# Patient Record
Sex: MALE | Race: WHITE | NOT HISPANIC OR LATINO | Employment: OTHER | URBAN - METROPOLITAN AREA
[De-identification: names, ages, dates, MRNs, and addresses within clinical notes are randomized per-mention and may not be internally consistent; named-entity substitution may affect disease eponyms.]

---

## 2018-02-27 ENCOUNTER — HOSPITAL ENCOUNTER (OUTPATIENT)
Dept: GENERAL RADIOLOGY | Facility: HOSPITAL | Age: 78
Discharge: HOME | End: 2018-02-27

## 2018-02-27 LAB
ALBUMIN SERPL-MCNC: 3.9 G/DL (ref 3.5–4.8)
ALBUMIN/GLOB SERPL: 1.3 {RATIO} (ref 1–1.7)
ALP SERPL-CCNC: 42 IU/L (ref 32–91)
ALT SERPL-CCNC: 14 IU/L (ref 17–63)
ANION GAP SERPL CALC-SCNC: 13.6 MMOL/L (ref 10–20)
AST SERPL-CCNC: 20 IU/L (ref 15–41)
BASOPHILS # BLD AUTO: 0 10*3/UL (ref 0–0.2)
BASOPHILS NFR BLD AUTO: 0 % (ref 0–2)
BILIRUB SERPL-MCNC: 0.2 MG/DL (ref 0.3–1.2)
BUN SERPL-MCNC: 26 MG/DL (ref 8–20)
BUN/CREAT SERPL: 18.6 (ref 6.2–20.3)
CALCIUM SERPL-MCNC: 9.3 MG/DL (ref 8.9–10.3)
CHLORIDE SERPL-SCNC: 99 MMOL/L (ref 101–111)
CONV CO2: 23 MMOL/L (ref 22–32)
CONV TOTAL PROTEIN: 7 G/DL (ref 6.1–7.9)
CREAT UR-MCNC: 1.4 MG/DL (ref 0.7–1.2)
DIFFERENTIAL METHOD BLD: (no result)
EOSINOPHIL # BLD AUTO: 0 10*3/UL (ref 0–0.3)
EOSINOPHIL # BLD AUTO: 1 % (ref 0–3)
ERYTHROCYTE [DISTWIDTH] IN BLOOD BY AUTOMATED COUNT: 14.6 % (ref 11.5–14.5)
GLOBULIN UR ELPH-MCNC: 3.1 G/DL (ref 2.5–3.8)
GLUCOSE SERPL-MCNC: 120 MG/DL (ref 65–99)
HCT VFR BLD AUTO: 36.2 % (ref 40–54)
HGB BLD-MCNC: 12.2 G/DL (ref 14–18)
LYMPHOCYTES # BLD AUTO: 1.3 10*3/UL (ref 0.8–4.8)
LYMPHOCYTES NFR BLD AUTO: 15 % (ref 18–42)
MCH RBC QN AUTO: 29 PG (ref 26–32)
MCHC RBC AUTO-ENTMCNC: 33.7 G/DL (ref 32–36)
MCV RBC AUTO: 86.2 FL (ref 80–94)
MONOCYTES # BLD AUTO: 0.6 10*3/UL (ref 0.1–1.3)
MONOCYTES NFR BLD AUTO: 8 % (ref 2–11)
NEUTROPHILS # BLD AUTO: 6.5 10*3/UL (ref 2.3–8.6)
NEUTROPHILS NFR BLD AUTO: 76 % (ref 50–75)
NRBC BLD AUTO-RTO: 0 /100{WBCS}
NRBC/RBC NFR BLD MANUAL: 0 10*3/UL
PLATELET # BLD AUTO: 236 10*3/UL (ref 150–450)
PMV BLD AUTO: 8.6 FL (ref 7.4–10.4)
POTASSIUM SERPL-SCNC: 3.6 MMOL/L (ref 3.6–5.1)
RBC # BLD AUTO: 4.2 10*6/UL (ref 4.6–6)
SODIUM SERPL-SCNC: 132 MMOL/L (ref 136–144)
WBC # BLD AUTO: 8.5 10*3/UL (ref 4.5–11.5)

## 2019-07-09 ENCOUNTER — ANTICOAGULATION VISIT (OUTPATIENT)
Dept: CARDIOLOGY | Facility: CLINIC | Age: 79
End: 2019-07-09

## 2019-07-09 DIAGNOSIS — I48.20 CHRONIC ATRIAL FIBRILLATION (HCC): ICD-10-CM

## 2019-07-09 DIAGNOSIS — Z79.01 LONG TERM (CURRENT) USE OF ANTICOAGULANTS: ICD-10-CM

## 2019-07-09 PROBLEM — I48.91 ATRIAL FIBRILLATION: Status: ACTIVE | Noted: 2019-07-09

## 2019-07-09 LAB — INR PPP: 2.5 (ref 2–3)

## 2019-07-15 RX ORDER — WARFARIN SODIUM 4 MG/1
TABLET ORAL
Qty: 90 TABLET | Refills: 0 | Status: SHIPPED | OUTPATIENT
Start: 2019-07-15 | End: 2019-11-20 | Stop reason: SDUPTHER

## 2019-08-13 ENCOUNTER — OUTSIDE FACILITY SERVICE (OUTPATIENT)
Dept: CARDIOLOGY | Facility: CLINIC | Age: 79
End: 2019-08-13

## 2019-08-13 PROCEDURE — 99213 OFFICE O/P EST LOW 20 MIN: CPT | Performed by: INTERNAL MEDICINE

## 2019-09-12 ENCOUNTER — ANTICOAGULATION VISIT (OUTPATIENT)
Dept: CARDIOLOGY | Facility: CLINIC | Age: 79
End: 2019-09-12

## 2019-09-12 DIAGNOSIS — I48.20 CHRONIC ATRIAL FIBRILLATION (HCC): ICD-10-CM

## 2019-09-12 DIAGNOSIS — Z79.01 LONG TERM (CURRENT) USE OF ANTICOAGULANTS: ICD-10-CM

## 2019-09-12 LAB — INR PPP: 2.7

## 2019-10-17 ENCOUNTER — ANTICOAGULATION VISIT (OUTPATIENT)
Dept: CARDIOLOGY | Facility: CLINIC | Age: 79
End: 2019-10-17

## 2019-10-17 DIAGNOSIS — Z79.01 LONG TERM (CURRENT) USE OF ANTICOAGULANTS: ICD-10-CM

## 2019-10-17 DIAGNOSIS — I48.91 ATRIAL FIBRILLATION, UNSPECIFIED TYPE (HCC): ICD-10-CM

## 2019-10-17 LAB — INR PPP: 3

## 2019-11-18 ENCOUNTER — ANTICOAGULATION VISIT (OUTPATIENT)
Dept: CARDIOLOGY | Facility: CLINIC | Age: 79
End: 2019-11-18

## 2019-11-18 DIAGNOSIS — Z79.01 LONG TERM (CURRENT) USE OF ANTICOAGULANTS: ICD-10-CM

## 2019-11-18 DIAGNOSIS — I48.91 ATRIAL FIBRILLATION, UNSPECIFIED TYPE (HCC): ICD-10-CM

## 2019-11-18 LAB — INR PPP: 2.8

## 2019-11-20 RX ORDER — WARFARIN SODIUM 4 MG/1
TABLET ORAL
Qty: 90 TABLET | Refills: 0 | Status: SHIPPED | OUTPATIENT
Start: 2019-11-20 | End: 2020-02-26 | Stop reason: SDUPTHER

## 2019-12-23 ENCOUNTER — ANTICOAGULATION VISIT (OUTPATIENT)
Dept: CARDIOLOGY | Facility: CLINIC | Age: 79
End: 2019-12-23

## 2019-12-23 DIAGNOSIS — Z79.01 LONG TERM (CURRENT) USE OF ANTICOAGULANTS: ICD-10-CM

## 2019-12-23 DIAGNOSIS — I48.91 ATRIAL FIBRILLATION, UNSPECIFIED TYPE (HCC): ICD-10-CM

## 2019-12-23 LAB — INR PPP: 2.6

## 2020-01-28 ENCOUNTER — ANTICOAGULATION VISIT (OUTPATIENT)
Dept: CARDIOLOGY | Facility: CLINIC | Age: 80
End: 2020-01-28

## 2020-01-28 DIAGNOSIS — I48.91 ATRIAL FIBRILLATION, UNSPECIFIED TYPE (HCC): ICD-10-CM

## 2020-01-28 DIAGNOSIS — Z79.01 LONG TERM (CURRENT) USE OF ANTICOAGULANTS: ICD-10-CM

## 2020-01-28 LAB — INR PPP: 3.9

## 2020-02-10 ENCOUNTER — ANTICOAGULATION VISIT (OUTPATIENT)
Dept: CARDIOLOGY | Facility: CLINIC | Age: 80
End: 2020-02-10

## 2020-02-10 DIAGNOSIS — I48.91 ATRIAL FIBRILLATION, UNSPECIFIED TYPE (HCC): ICD-10-CM

## 2020-02-10 DIAGNOSIS — Z79.01 LONG TERM (CURRENT) USE OF ANTICOAGULANTS: ICD-10-CM

## 2020-02-10 LAB — INR PPP: 2.8

## 2020-02-24 LAB — INR PPP: 2.89

## 2020-02-25 ENCOUNTER — OUTSIDE FACILITY SERVICE (OUTPATIENT)
Dept: CARDIOLOGY | Facility: CLINIC | Age: 80
End: 2020-02-25

## 2020-02-25 PROCEDURE — 99214 OFFICE O/P EST MOD 30 MIN: CPT | Performed by: INTERNAL MEDICINE

## 2020-02-25 PROCEDURE — 93010 ELECTROCARDIOGRAM REPORT: CPT | Performed by: INTERNAL MEDICINE

## 2020-02-26 ENCOUNTER — ANTICOAGULATION VISIT (OUTPATIENT)
Dept: CARDIOLOGY | Facility: CLINIC | Age: 80
End: 2020-02-26

## 2020-02-26 ENCOUNTER — TELEPHONE (OUTPATIENT)
Dept: CARDIOLOGY | Facility: CLINIC | Age: 80
End: 2020-02-26

## 2020-02-26 DIAGNOSIS — I48.91 ATRIAL FIBRILLATION, UNSPECIFIED TYPE (HCC): ICD-10-CM

## 2020-02-26 DIAGNOSIS — N28.9 RENAL INSUFFICIENCY: Primary | ICD-10-CM

## 2020-02-26 DIAGNOSIS — Z79.01 LONG TERM (CURRENT) USE OF ANTICOAGULANTS: ICD-10-CM

## 2020-02-26 RX ORDER — WARFARIN SODIUM 4 MG/1
4 TABLET ORAL DAILY
Qty: 90 TABLET | Refills: 3 | Status: SHIPPED | OUTPATIENT
Start: 2020-02-26 | End: 2020-08-25 | Stop reason: SDUPTHER

## 2020-02-26 RX ORDER — LISINOPRIL AND HYDROCHLOROTHIAZIDE 25; 20 MG/1; MG/1
1 TABLET ORAL DAILY
Qty: 90 TABLET | Refills: 3 | Status: SHIPPED | OUTPATIENT
Start: 2020-02-26 | End: 2020-08-26 | Stop reason: SDUPTHER

## 2020-02-26 RX ORDER — SIMVASTATIN 20 MG
20 TABLET ORAL NIGHTLY
Qty: 90 TABLET | Refills: 3 | Status: SHIPPED | OUTPATIENT
Start: 2020-02-26 | End: 2020-08-26 | Stop reason: SDUPTHER

## 2020-02-26 RX ORDER — SIMVASTATIN 20 MG
TABLET ORAL
COMMUNITY
Start: 2020-01-21 | End: 2020-02-26 | Stop reason: SDUPTHER

## 2020-02-26 RX ORDER — LISINOPRIL AND HYDROCHLOROTHIAZIDE 25; 20 MG/1; MG/1
TABLET ORAL
COMMUNITY
Start: 2019-12-26 | End: 2020-02-26 | Stop reason: SDUPTHER

## 2020-02-26 NOTE — TELEPHONE ENCOUNTER
Received email from [facility] @ [facility] for refills on Simvastatin 20mg, Warfarin 4mg, & Lisinopril-HCTZ 20/25

## 2020-02-26 NOTE — TELEPHONE ENCOUNTER
PTS DAUGHTER CALLED IN. PT WAS SEEN YESTERDAY IN [place]. NEEDS TO SEE NEPHROLOGIST IN [place]. PLEASE PUT IN REFERRAL SO IT CAN BE SENT TO [facility]  (DR. RODRIGUEZ). PLEASE CALL LUISITO [phone].

## 2020-03-20 ENCOUNTER — LAB (OUTPATIENT)
Dept: LAB | Facility: HOSPITAL | Age: 80
End: 2020-03-20

## 2020-03-20 ENCOUNTER — TRANSCRIBE ORDERS (OUTPATIENT)
Dept: ADMINISTRATIVE | Facility: HOSPITAL | Age: 80
End: 2020-03-20

## 2020-03-20 DIAGNOSIS — N18.30 CHRONIC KIDNEY DISEASE, STAGE III (MODERATE) (HCC): ICD-10-CM

## 2020-03-20 DIAGNOSIS — N18.30 CHRONIC KIDNEY DISEASE, STAGE III (MODERATE) (HCC): Primary | ICD-10-CM

## 2020-03-20 LAB
ANION GAP SERPL CALCULATED.3IONS-SCNC: 11.7 MMOL/L (ref 5–15)
BASOPHILS # BLD AUTO: 0.02 10*3/MM3 (ref 0–0.2)
BASOPHILS NFR BLD AUTO: 0.3 % (ref 0–1.5)
BILIRUB UR QL STRIP: NEGATIVE
BUN BLD-MCNC: 28 MG/DL (ref 8–23)
BUN/CREAT SERPL: 18.4 (ref 7–25)
CALCIUM SPEC-SCNC: 9.5 MG/DL (ref 8.6–10.5)
CHLORIDE SERPL-SCNC: 101 MMOL/L (ref 98–107)
CLARITY UR: CLEAR
CO2 SERPL-SCNC: 22.3 MMOL/L (ref 22–29)
COLOR UR: YELLOW
CREAT BLD-MCNC: 1.52 MG/DL (ref 0.76–1.27)
DEPRECATED RDW RBC AUTO: 43.8 FL (ref 37–54)
EOSINOPHIL # BLD AUTO: 0.07 10*3/MM3 (ref 0–0.4)
EOSINOPHIL NFR BLD AUTO: 1 % (ref 0.3–6.2)
ERYTHROCYTE [DISTWIDTH] IN BLOOD BY AUTOMATED COUNT: 13.9 % (ref 12.3–15.4)
GFR SERPL CREATININE-BSD FRML MDRD: 44 ML/MIN/1.73
GLUCOSE BLD-MCNC: 112 MG/DL (ref 65–99)
GLUCOSE UR STRIP-MCNC: NEGATIVE MG/DL
HCT VFR BLD AUTO: 36.5 % (ref 37.5–51)
HGB BLD-MCNC: 12.1 G/DL (ref 13–17.7)
HGB UR QL STRIP.AUTO: NEGATIVE
IMM GRANULOCYTES # BLD AUTO: 0.01 10*3/MM3 (ref 0–0.05)
IMM GRANULOCYTES NFR BLD AUTO: 0.1 % (ref 0–0.5)
KETONES UR QL STRIP: ABNORMAL
LEUKOCYTE ESTERASE UR QL STRIP.AUTO: NEGATIVE
LYMPHOCYTES # BLD AUTO: 1.46 10*3/MM3 (ref 0.7–3.1)
LYMPHOCYTES NFR BLD AUTO: 20.1 % (ref 19.6–45.3)
MCH RBC QN AUTO: 28.9 PG (ref 26.6–33)
MCHC RBC AUTO-ENTMCNC: 33.2 G/DL (ref 31.5–35.7)
MCV RBC AUTO: 87.1 FL (ref 79–97)
MONOCYTES # BLD AUTO: 0.53 10*3/MM3 (ref 0.1–0.9)
MONOCYTES NFR BLD AUTO: 7.3 % (ref 5–12)
NEUTROPHILS # BLD AUTO: 5.19 10*3/MM3 (ref 1.7–7)
NEUTROPHILS NFR BLD AUTO: 71.2 % (ref 42.7–76)
NITRITE UR QL STRIP: NEGATIVE
NRBC BLD AUTO-RTO: 0 /100 WBC (ref 0–0.2)
PH UR STRIP.AUTO: <=5 [PH] (ref 5–8)
PLATELET # BLD AUTO: 253 10*3/MM3 (ref 140–450)
PMV BLD AUTO: 11.4 FL (ref 6–12)
POTASSIUM BLD-SCNC: 4.5 MMOL/L (ref 3.5–5.2)
PROT UR QL STRIP: NEGATIVE
RBC # BLD AUTO: 4.19 10*6/MM3 (ref 4.14–5.8)
SODIUM BLD-SCNC: 135 MMOL/L (ref 136–145)
SP GR UR STRIP: >=1.03 (ref 1–1.03)
UROBILINOGEN UR QL STRIP: ABNORMAL
WBC NRBC COR # BLD: 7.28 10*3/MM3 (ref 3.4–10.8)

## 2020-03-20 PROCEDURE — 80048 BASIC METABOLIC PNL TOTAL CA: CPT

## 2020-03-20 PROCEDURE — 81003 URINALYSIS AUTO W/O SCOPE: CPT

## 2020-03-20 PROCEDURE — 85025 COMPLETE CBC W/AUTO DIFF WBC: CPT

## 2020-03-20 PROCEDURE — 36415 COLL VENOUS BLD VENIPUNCTURE: CPT

## 2020-03-27 ENCOUNTER — ANTICOAGULATION VISIT (OUTPATIENT)
Dept: CARDIOLOGY | Facility: CLINIC | Age: 80
End: 2020-03-27

## 2020-03-27 DIAGNOSIS — I48.91 ATRIAL FIBRILLATION, UNSPECIFIED TYPE (HCC): ICD-10-CM

## 2020-03-27 DIAGNOSIS — Z79.01 LONG TERM (CURRENT) USE OF ANTICOAGULANTS: ICD-10-CM

## 2020-03-27 LAB — INR PPP: 2.8

## 2020-05-11 ENCOUNTER — ANTICOAGULATION VISIT (OUTPATIENT)
Dept: CARDIOLOGY | Facility: CLINIC | Age: 80
End: 2020-05-11

## 2020-05-11 DIAGNOSIS — Z79.01 LONG TERM (CURRENT) USE OF ANTICOAGULANTS: ICD-10-CM

## 2020-05-11 DIAGNOSIS — I48.91 ATRIAL FIBRILLATION, UNSPECIFIED TYPE (HCC): ICD-10-CM

## 2020-05-11 LAB — INR PPP: 3.5

## 2020-06-15 ENCOUNTER — ANTICOAGULATION VISIT (OUTPATIENT)
Dept: CARDIOLOGY | Facility: CLINIC | Age: 80
End: 2020-06-15

## 2020-06-15 DIAGNOSIS — I48.91 ATRIAL FIBRILLATION, UNSPECIFIED TYPE (HCC): ICD-10-CM

## 2020-06-15 DIAGNOSIS — Z79.01 LONG TERM (CURRENT) USE OF ANTICOAGULANTS: ICD-10-CM

## 2020-06-15 LAB — INR PPP: 1.9

## 2020-07-24 ENCOUNTER — ANTICOAGULATION VISIT (OUTPATIENT)
Dept: CARDIOLOGY | Facility: CLINIC | Age: 80
End: 2020-07-24

## 2020-07-24 DIAGNOSIS — I48.91 ATRIAL FIBRILLATION, UNSPECIFIED TYPE (HCC): ICD-10-CM

## 2020-07-24 DIAGNOSIS — Z79.01 LONG TERM (CURRENT) USE OF ANTICOAGULANTS: ICD-10-CM

## 2020-07-24 LAB — INR PPP: 2

## 2020-08-24 ENCOUNTER — ANTICOAGULATION VISIT (OUTPATIENT)
Dept: CARDIOLOGY | Facility: CLINIC | Age: 80
End: 2020-08-24

## 2020-08-24 DIAGNOSIS — Z79.01 LONG TERM (CURRENT) USE OF ANTICOAGULANTS: ICD-10-CM

## 2020-08-24 DIAGNOSIS — I48.91 ATRIAL FIBRILLATION, UNSPECIFIED TYPE (HCC): ICD-10-CM

## 2020-08-24 LAB — INR PPP: 2.2

## 2020-08-25 ENCOUNTER — TELEPHONE (OUTPATIENT)
Dept: CARDIOLOGY | Facility: CLINIC | Age: 80
End: 2020-08-25

## 2020-08-25 ENCOUNTER — OUTSIDE FACILITY SERVICE (OUTPATIENT)
Dept: CARDIOLOGY | Facility: CLINIC | Age: 80
End: 2020-08-25

## 2020-08-25 DIAGNOSIS — Z79.01 LONG TERM (CURRENT) USE OF ANTICOAGULANTS: ICD-10-CM

## 2020-08-25 DIAGNOSIS — I48.91 ATRIAL FIBRILLATION, UNSPECIFIED TYPE (HCC): ICD-10-CM

## 2020-08-25 DIAGNOSIS — I48.20 CHRONIC ATRIAL FIBRILLATION (HCC): Primary | ICD-10-CM

## 2020-08-25 PROCEDURE — 99214 OFFICE O/P EST MOD 30 MIN: CPT | Performed by: INTERNAL MEDICINE

## 2020-08-25 RX ORDER — WARFARIN SODIUM 4 MG/1
4 TABLET ORAL DAILY
Qty: 90 TABLET | Refills: 0 | Status: SHIPPED | OUTPATIENT
Start: 2020-08-25 | End: 2020-12-24 | Stop reason: SDUPTHER

## 2020-08-26 PROBLEM — Z95.1 STATUS POST CORONARY ARTERY BYPASS GRAFT: Status: ACTIVE | Noted: 2017-05-10

## 2020-08-26 PROBLEM — I25.10 CAD (CORONARY ARTERY DISEASE): Status: ACTIVE | Noted: 2020-02-25

## 2020-08-26 PROBLEM — Z72.0 TOBACCO USE: Status: ACTIVE | Noted: 2020-02-25

## 2020-08-26 RX ORDER — BRIMONIDINE TARTRATE 2 MG/ML
SOLUTION/ DROPS OPHTHALMIC 2 TIMES DAILY
COMMUNITY
Start: 2020-08-19

## 2020-08-26 RX ORDER — LISINOPRIL AND HYDROCHLOROTHIAZIDE 25; 20 MG/1; MG/1
1 TABLET ORAL DAILY
Qty: 90 TABLET | Refills: 3 | Status: SHIPPED | OUTPATIENT
Start: 2020-08-26 | End: 2021-03-10 | Stop reason: SDUPTHER

## 2020-08-26 RX ORDER — BIMATOPROST 0.01 %
DROPS OPHTHALMIC (EYE)
COMMUNITY
Start: 2020-07-13

## 2020-08-26 RX ORDER — SIMVASTATIN 20 MG
20 TABLET ORAL NIGHTLY
Qty: 90 TABLET | Refills: 3 | Status: SHIPPED | OUTPATIENT
Start: 2020-08-26 | End: 2021-03-10 | Stop reason: SDUPTHER

## 2020-09-22 ENCOUNTER — ANTICOAGULATION VISIT (OUTPATIENT)
Dept: CARDIOLOGY | Facility: CLINIC | Age: 80
End: 2020-09-22

## 2020-09-22 DIAGNOSIS — I48.91 ATRIAL FIBRILLATION, UNSPECIFIED TYPE (HCC): ICD-10-CM

## 2020-09-22 DIAGNOSIS — Z79.01 LONG TERM (CURRENT) USE OF ANTICOAGULANTS: ICD-10-CM

## 2020-09-22 LAB — INR PPP: 2.1

## 2020-10-28 ENCOUNTER — ANTICOAGULATION VISIT (OUTPATIENT)
Dept: CARDIOLOGY | Facility: CLINIC | Age: 80
End: 2020-10-28

## 2020-10-28 DIAGNOSIS — Z79.01 LONG TERM (CURRENT) USE OF ANTICOAGULANTS: ICD-10-CM

## 2020-10-28 DIAGNOSIS — I48.91 ATRIAL FIBRILLATION, UNSPECIFIED TYPE (HCC): ICD-10-CM

## 2020-10-28 LAB — INR PPP: 2.2

## 2020-12-07 LAB — INR PPP: 2.3

## 2020-12-08 ENCOUNTER — ANTICOAGULATION VISIT (OUTPATIENT)
Dept: CARDIOLOGY | Facility: CLINIC | Age: 80
End: 2020-12-08

## 2020-12-08 DIAGNOSIS — I48.91 ATRIAL FIBRILLATION, UNSPECIFIED TYPE (HCC): ICD-10-CM

## 2020-12-08 DIAGNOSIS — Z79.01 LONG TERM (CURRENT) USE OF ANTICOAGULANTS: ICD-10-CM

## 2020-12-24 DIAGNOSIS — I48.20 CHRONIC ATRIAL FIBRILLATION (HCC): ICD-10-CM

## 2020-12-24 DIAGNOSIS — Z79.01 LONG TERM (CURRENT) USE OF ANTICOAGULANTS: ICD-10-CM

## 2020-12-24 RX ORDER — WARFARIN SODIUM 4 MG/1
4 TABLET ORAL DAILY
Qty: 90 TABLET | Refills: 0 | Status: SHIPPED | OUTPATIENT
Start: 2020-12-24 | End: 2021-03-10 | Stop reason: SDUPTHER

## 2021-01-12 ENCOUNTER — ANTICOAGULATION VISIT (OUTPATIENT)
Dept: CARDIOLOGY | Facility: CLINIC | Age: 81
End: 2021-01-12

## 2021-01-12 DIAGNOSIS — Z79.01 LONG TERM (CURRENT) USE OF ANTICOAGULANTS: ICD-10-CM

## 2021-01-12 LAB — INR PPP: 2.8

## 2021-02-23 ENCOUNTER — ANTICOAGULATION VISIT (OUTPATIENT)
Dept: CARDIOLOGY | Facility: CLINIC | Age: 81
End: 2021-02-23

## 2021-02-23 DIAGNOSIS — I48.91 ATRIAL FIBRILLATION, UNSPECIFIED TYPE (HCC): ICD-10-CM

## 2021-02-23 DIAGNOSIS — Z79.01 LONG TERM (CURRENT) USE OF ANTICOAGULANTS: ICD-10-CM

## 2021-02-23 LAB — INR PPP: 2.6

## 2021-03-01 LAB
ALBUMIN SERPL-MCNC: 4.1 G/DL (ref 3.6–4.6)
ALBUMIN/GLOB SERPL: 1.7 {RATIO} (ref 1.2–2.2)
ALP SERPL-CCNC: 40 IU/L (ref 39–117)
ALT SERPL-CCNC: 12 IU/L (ref 0–44)
AMBIG ABBREV CMP14 DEFAULT: NORMAL
AMBIG ABBREV LP DEFAULT: NORMAL
AST SERPL-CCNC: 16 IU/L (ref 0–40)
BILIRUB SERPL-MCNC: 0.3 MG/DL (ref 0–1.2)
BUN SERPL-MCNC: 23 MG/DL (ref 8–27)
BUN/CREAT SERPL: 21 (ref 10–24)
CALCIUM SERPL-MCNC: 9.3 MG/DL (ref 8.6–10.2)
CHLORIDE SERPL-SCNC: 105 MMOL/L (ref 96–106)
CHOLEST SERPL-MCNC: 143 MG/DL (ref 100–199)
CK SERPL-CCNC: 81 U/L (ref 30–208)
CO2 SERPL-SCNC: 24 MMOL/L (ref 20–29)
CREAT SERPL-MCNC: 1.1 MG/DL (ref 0.76–1.27)
GLOBULIN SER CALC-MCNC: 2.4 G/DL (ref 1.5–4.5)
GLUCOSE SERPL-MCNC: 97 MG/DL (ref 65–99)
HDLC SERPL-MCNC: 36 MG/DL
LDLC SERPL CALC-MCNC: 76 MG/DL (ref 0–99)
Lab: NORMAL
POTASSIUM SERPL-SCNC: 4.7 MMOL/L (ref 3.5–5.2)
PROT SERPL-MCNC: 6.5 G/DL (ref 6–8.5)
SODIUM SERPL-SCNC: 140 MMOL/L (ref 134–144)
TRIGL SERPL-MCNC: 184 MG/DL (ref 0–149)
VLDLC SERPL CALC-MCNC: 31 MG/DL (ref 5–40)

## 2021-03-02 ENCOUNTER — OUTSIDE FACILITY SERVICE (OUTPATIENT)
Dept: CARDIOLOGY | Facility: CLINIC | Age: 81
End: 2021-03-02

## 2021-03-02 PROCEDURE — 93010 ELECTROCARDIOGRAM REPORT: CPT | Performed by: INTERNAL MEDICINE

## 2021-03-02 PROCEDURE — OUTSIDEPOS PR OUTSIDE POS PLACEHOLDER: Performed by: INTERNAL MEDICINE

## 2021-03-02 PROCEDURE — 99214 OFFICE O/P EST MOD 30 MIN: CPT | Performed by: INTERNAL MEDICINE

## 2021-03-10 DIAGNOSIS — Z79.01 LONG TERM (CURRENT) USE OF ANTICOAGULANTS: ICD-10-CM

## 2021-03-10 DIAGNOSIS — I48.20 CHRONIC ATRIAL FIBRILLATION (HCC): ICD-10-CM

## 2021-03-10 RX ORDER — SIMVASTATIN 20 MG
20 TABLET ORAL NIGHTLY
Qty: 90 TABLET | Refills: 3 | Status: SHIPPED | OUTPATIENT
Start: 2021-03-10 | End: 2022-01-27 | Stop reason: SDUPTHER

## 2021-03-10 RX ORDER — LISINOPRIL AND HYDROCHLOROTHIAZIDE 25; 20 MG/1; MG/1
1 TABLET ORAL DAILY
Qty: 90 TABLET | Refills: 3 | Status: SHIPPED | OUTPATIENT
Start: 2021-03-10 | End: 2022-01-27 | Stop reason: SDUPTHER

## 2021-03-10 RX ORDER — WARFARIN SODIUM 4 MG/1
4 TABLET ORAL DAILY
Qty: 90 TABLET | Refills: 0 | Status: SHIPPED | OUTPATIENT
Start: 2021-03-10 | End: 2021-06-22 | Stop reason: SDUPTHER

## 2021-03-11 RX ORDER — MELATONIN
2000 DAILY
COMMUNITY
End: 2021-10-07

## 2021-04-05 LAB — INR PPP: 1.9

## 2021-04-06 ENCOUNTER — ANTICOAGULATION VISIT (OUTPATIENT)
Dept: CARDIOLOGY | Facility: CLINIC | Age: 81
End: 2021-04-06

## 2021-04-06 DIAGNOSIS — I48.91 ATRIAL FIBRILLATION, UNSPECIFIED TYPE (HCC): ICD-10-CM

## 2021-04-06 DIAGNOSIS — Z79.01 LONG TERM (CURRENT) USE OF ANTICOAGULANTS: ICD-10-CM

## 2021-05-17 ENCOUNTER — TELEPHONE (OUTPATIENT)
Dept: CARDIOLOGY | Facility: CLINIC | Age: 81
End: 2021-05-17

## 2021-06-03 ENCOUNTER — TELEPHONE (OUTPATIENT)
Dept: CARDIOLOGY | Facility: CLINIC | Age: 81
End: 2021-06-03

## 2021-06-03 NOTE — TELEPHONE ENCOUNTER
Pt states that he had test done last month at Dr. Hsu office. He said he will be due to get again in a couple of weeks. Advised him to have them send our office May and June results. Adis

## 2021-06-08 ENCOUNTER — ANTICOAGULATION VISIT (OUTPATIENT)
Dept: CARDIOLOGY | Facility: CLINIC | Age: 81
End: 2021-06-08

## 2021-06-08 DIAGNOSIS — Z79.01 LONG TERM (CURRENT) USE OF ANTICOAGULANTS: ICD-10-CM

## 2021-06-08 DIAGNOSIS — I48.11 LONGSTANDING PERSISTENT ATRIAL FIBRILLATION (HCC): Primary | ICD-10-CM

## 2021-06-08 LAB — INR PPP: 2

## 2021-06-22 DIAGNOSIS — Z79.01 LONG TERM (CURRENT) USE OF ANTICOAGULANTS: ICD-10-CM

## 2021-06-22 DIAGNOSIS — I48.20 CHRONIC ATRIAL FIBRILLATION (HCC): ICD-10-CM

## 2021-06-22 RX ORDER — WARFARIN SODIUM 4 MG/1
4 TABLET ORAL DAILY
Qty: 90 TABLET | Refills: 0 | Status: SHIPPED | OUTPATIENT
Start: 2021-06-22 | End: 2022-01-27 | Stop reason: SDUPTHER

## 2021-07-06 LAB — INR PPP: 1.8

## 2021-07-07 ENCOUNTER — ANTICOAGULATION VISIT (OUTPATIENT)
Dept: CARDIOLOGY | Facility: CLINIC | Age: 81
End: 2021-07-07

## 2021-07-07 DIAGNOSIS — I48.91 ATRIAL FIBRILLATION, UNSPECIFIED TYPE (HCC): Primary | ICD-10-CM

## 2021-07-07 DIAGNOSIS — Z79.01 LONG TERM (CURRENT) USE OF ANTICOAGULANTS: ICD-10-CM

## 2021-08-20 ENCOUNTER — TELEPHONE (OUTPATIENT)
Dept: CARDIOLOGY | Facility: CLINIC | Age: 81
End: 2021-08-20

## 2021-08-23 ENCOUNTER — ANTICOAGULATION VISIT (OUTPATIENT)
Dept: CARDIOLOGY | Facility: CLINIC | Age: 81
End: 2021-08-23

## 2021-08-23 DIAGNOSIS — Z79.01 LONG TERM (CURRENT) USE OF ANTICOAGULANTS: Primary | ICD-10-CM

## 2021-08-23 LAB — INR PPP: 2.2

## 2021-09-14 ENCOUNTER — OUTSIDE FACILITY SERVICE (OUTPATIENT)
Dept: CARDIOLOGY | Facility: CLINIC | Age: 81
End: 2021-09-14

## 2021-09-14 PROCEDURE — 93010 ELECTROCARDIOGRAM REPORT: CPT | Performed by: INTERNAL MEDICINE

## 2021-09-14 PROCEDURE — 99214 OFFICE O/P EST MOD 30 MIN: CPT | Performed by: INTERNAL MEDICINE

## 2021-10-21 ENCOUNTER — OFFICE VISIT (OUTPATIENT)
Dept: CARDIOLOGY | Facility: CLINIC | Age: 81
End: 2021-10-21

## 2021-10-21 VITALS
SYSTOLIC BLOOD PRESSURE: 142 MMHG | OXYGEN SATURATION: 98 % | RESPIRATION RATE: 20 BRPM | DIASTOLIC BLOOD PRESSURE: 90 MMHG | WEIGHT: 212 LBS | BODY MASS INDEX: 30.35 KG/M2 | HEIGHT: 70 IN | HEART RATE: 80 BPM

## 2021-10-21 DIAGNOSIS — I27.20 PULMONARY HYPERTENSION (HCC): ICD-10-CM

## 2021-10-21 DIAGNOSIS — Z95.1 STATUS POST CORONARY ARTERY BYPASS GRAFT: ICD-10-CM

## 2021-10-21 DIAGNOSIS — I10 PRIMARY HYPERTENSION: ICD-10-CM

## 2021-10-21 DIAGNOSIS — I25.10 CORONARY ARTERY DISEASE INVOLVING NATIVE CORONARY ARTERY OF NATIVE HEART WITHOUT ANGINA PECTORIS: Primary | ICD-10-CM

## 2021-10-21 DIAGNOSIS — I48.0 PAF (PAROXYSMAL ATRIAL FIBRILLATION) (HCC): ICD-10-CM

## 2021-10-21 PROCEDURE — 99214 OFFICE O/P EST MOD 30 MIN: CPT | Performed by: INTERNAL MEDICINE

## 2021-10-21 NOTE — PROGRESS NOTES
Subjective:     Encounter Date:10/21/2021      Patient ID: Jamin Gardner is a 81 y.o. male.    Chief Complaint:  Chief Complaint   Patient presents with   • Establish Care   • Atrial Fibrillation   • Coronary Artery Disease       HPI:  Mr Gardner is an 80 yo who presents for a second opinion regarding his overall cardiac condition.  His past medical history is significant for HTN, HLD, and CAD with a CABG in 2002.  He also has a history of paroxysmal atrial fibrillation and  Questionable pulmonary HTN.    He has been having symptoms of fatigue, dyspnea and lightheadedness for months.  He has also noted some mild chest discomfort. He denies any palpitations.  His most recent cardiac evaluation has included an echo 10/2021 which showed mild LV enlargement with a normal EF, normal RV size and function, mild MR with severe LAE, AoV sclerosis with mild AR, moderate TR with an estimated RVSP of 30mmHg.  A nuclear stress test 10/2021 showed a fixed inferior defect with no ischemia.      The following portions of the patient's history were reviewed and updated as appropriate: current medications, past family history, past medical history, past social history, past surgical history and problem list.    Problem List:  Patient Active Problem List   Diagnosis   • Atrial fibrillation (CMS/HCC) [I48.91]   • Long term (current) use of anticoagulants [Z79.01]   • CAD (coronary artery disease)   • Status post coronary artery bypass graft   • Tobacco use   • PAF (paroxysmal atrial fibrillation) ([facility]ly [facility])   • Primary hypertension   • Pulmonary hypertension ([facility])   • Hyperlipidemia       Past Medical History:  Past Medical History:   Diagnosis Date   • CAD (coronary artery disease)    • Hyperlipidemia    • PAF (paroxysmal atrial fibrillation) (Formerly [facility])    • Primary hypertension    • Pulmonary hypertension ([facility])        Past Surgical History:  Past Surgical History:   Procedure Laterality Date   • CORONARY ARTERY BYPASS GRAFT  2002  "      Social History:  Social History     Socioeconomic History   • Marital status:    Tobacco Use   • Smoking status: Never Smoker   • Smokeless tobacco: Current User     Types: Chew   Substance and Sexual Activity   • Alcohol use: Not Currently   • Drug use: Never   • Sexual activity: Defer       Allergies:  No Known Allergies    Immunizations:    There is no immunization history on file for this patient.    ROS:  Review of Systems   Constitutional: Positive for malaise/fatigue.   Cardiovascular: Positive for dyspnea on exertion. Negative for chest pain, irregular heartbeat, leg swelling, near-syncope, orthopnea, palpitations, paroxysmal nocturnal dyspnea and syncope.   Respiratory: Positive for shortness of breath.    Neurological: Positive for light-headedness.   All other systems reviewed and are negative.         Objective:         /90   Pulse 80   Resp 20   Ht 177.8 cm (70\")   Wt 96.2 kg (212 lb)   SpO2 98%   BMI 30.42 kg/m²     Constitutional:       General: Not in acute distress.     Appearance: Well-developed.   Eyes:      General: No scleral icterus.     Conjunctiva/sclera: Conjunctivae normal.      Pupils: Pupils are equal, round, and reactive to light.   HENT:      Head: Normocephalic and atraumatic.   Neck:      Thyroid: No thyromegaly.   Pulmonary:      Effort: Pulmonary effort is normal.      Breath sounds: Normal breath sounds.   Cardiovascular:      Normal rate. Regular rhythm.   Abdominal:      General: Bowel sounds are normal.      Palpations: Abdomen is soft.   Musculoskeletal: Normal range of motion.      Cervical back: Normal range of motion. Skin:     General: Skin is warm and dry.   Neurological:      Mental Status: Alert and oriented to person, place, and time.         In-Office Procedure(s):  Procedures    ASCVD RIsk Score::  The ASCVD Risk score (Archer MICHELLE Jr., et al., 2013) failed to calculate for the following reasons:    The 2013 ASCVD risk score is only valid for ages " 40 to 79    Recent Radiology:  Imaging Results (Most Recent)     None          Lab Review:   Anticoagulation Visit on 08/23/2021   Component Date Value   • INR 08/23/2021 2.20                 Assessment:          Diagnosis Plan   1. Coronary artery disease involving native coronary artery of native heart without angina pectoris     2. Status post coronary artery bypass graft     3. Primary hypertension     4. PAF (paroxysmal atrial fibrillation) ([facility])     5. Pulmonary hypertension ([facility])            Plan:      1. CAD with CABG in 2002 -  recent nuclear stress test showed only a fixed inferior defect with no ischemia but he continues to have chest discomfort and dyspnea.  With history of pulmonary HTN with get right and left heart cath.    2. Paroxysmal AF - [id number] of at least 3(age, HTN, vascular disease), on coumadin, no symptoms of palpitations but he certainly could be having AF causing his symptoms of lightheadedness and dyspnea.  Discussed indications, risks and benefits of loop recorder.    3. HTN - a little high today but is also low at times at home, will continue to monitor    4. ? Pulmonary HTN - right heart cath    5. HLD - statin          Level of Care:                 Andrew Galvan MD  10/21/21  .

## 2021-10-22 ENCOUNTER — PATIENT ROUNDING (BHMG ONLY) (OUTPATIENT)
Dept: CARDIOLOGY | Facility: CLINIC | Age: 81
End: 2021-10-22

## 2021-10-22 NOTE — PROGRESS NOTES
October 22, 2021    Hello, may I speak with Jamin Gardner?    My name is Magnolia Fuller     I am  with [facility]  [address]  [facility] [address] [phone].    Before we get started may I verify your date of birth? 1940    I am calling to officially welcome you to our practice and ask about your recent visit. Is this a good time to talk? yes    Tell me about your visit with us. What things went well?  went ok       We're always looking for ways to make our patients' experiences even better. Do you have recommendations on ways we may improve?  no    Overall were you satisfied with your first visit to our practice? yes       I appreciate you taking the time to speak with me today. Is there anything else I can do for you? no      Thank you, and have a great day.

## 2021-10-26 DIAGNOSIS — Z01.818 PRE-OP TESTING: Primary | ICD-10-CM

## 2021-10-26 DIAGNOSIS — I25.10 CORONARY ARTERY DISEASE INVOLVING NATIVE CORONARY ARTERY OF NATIVE HEART WITHOUT ANGINA PECTORIS: ICD-10-CM

## 2021-10-26 DIAGNOSIS — I27.20 PULMONARY HYPERTENSION (HCC): ICD-10-CM

## 2021-10-26 DIAGNOSIS — I25.10 CORONARY ARTERY DISEASE INVOLVING NATIVE CORONARY ARTERY OF NATIVE HEART WITHOUT ANGINA PECTORIS: Primary | ICD-10-CM

## 2021-10-29 ENCOUNTER — TRANSCRIBE ORDERS (OUTPATIENT)
Dept: CARDIOLOGY | Facility: CLINIC | Age: 81
End: 2021-10-29

## 2021-10-29 DIAGNOSIS — Z01.818 OTHER SPECIFIED PRE-OPERATIVE EXAMINATION: Primary | ICD-10-CM

## 2021-11-01 ENCOUNTER — LAB (OUTPATIENT)
Dept: LAB | Facility: HOSPITAL | Age: 81
End: 2021-11-01

## 2021-11-01 DIAGNOSIS — Z01.818 PRE-OP TESTING: ICD-10-CM

## 2021-11-01 LAB — SARS-COV-2 ORF1AB RESP QL NAA+PROBE: NOT DETECTED

## 2021-11-01 PROCEDURE — U0005 INFEC AGEN DETEC AMPLI PROBE: HCPCS

## 2021-11-01 PROCEDURE — U0004 COV-19 TEST NON-CDC HGH THRU: HCPCS

## 2021-11-01 PROCEDURE — C9803 HOPD COVID-19 SPEC COLLECT: HCPCS

## 2021-11-02 ENCOUNTER — LAB (OUTPATIENT)
Dept: LAB | Facility: HOSPITAL | Age: 81
End: 2021-11-02

## 2021-11-02 DIAGNOSIS — I25.10 CORONARY ARTERY DISEASE INVOLVING NATIVE CORONARY ARTERY OF NATIVE HEART WITHOUT ANGINA PECTORIS: ICD-10-CM

## 2021-11-02 DIAGNOSIS — I27.20 PULMONARY HYPERTENSION (HCC): ICD-10-CM

## 2021-11-02 DIAGNOSIS — Z01.818 PRE-OP TESTING: ICD-10-CM

## 2021-11-02 LAB
ALBUMIN SERPL-MCNC: 4.5 G/DL (ref 3.5–5.2)
ALBUMIN/GLOB SERPL: 1.7 G/DL
ALP SERPL-CCNC: 54 U/L (ref 39–117)
ALT SERPL W P-5'-P-CCNC: 9 U/L (ref 1–41)
ANION GAP SERPL CALCULATED.3IONS-SCNC: 8.4 MMOL/L (ref 5–15)
AST SERPL-CCNC: 13 U/L (ref 1–40)
BASOPHILS # BLD AUTO: 0.02 10*3/MM3 (ref 0–0.2)
BASOPHILS NFR BLD AUTO: 0.2 % (ref 0–1.5)
BILIRUB SERPL-MCNC: <0.2 MG/DL (ref 0–1.2)
BUN SERPL-MCNC: 27 MG/DL (ref 8–23)
BUN/CREAT SERPL: 19.7 (ref 7–25)
CALCIUM SPEC-SCNC: 9.2 MG/DL (ref 8.6–10.5)
CHLORIDE SERPL-SCNC: 104 MMOL/L (ref 98–107)
CO2 SERPL-SCNC: 26.6 MMOL/L (ref 22–29)
CREAT SERPL-MCNC: 1.37 MG/DL (ref 0.76–1.27)
DEPRECATED RDW RBC AUTO: 43.6 FL (ref 37–54)
EOSINOPHIL # BLD AUTO: 0.08 10*3/MM3 (ref 0–0.4)
EOSINOPHIL NFR BLD AUTO: 1 % (ref 0.3–6.2)
ERYTHROCYTE [DISTWIDTH] IN BLOOD BY AUTOMATED COUNT: 13.7 % (ref 12.3–15.4)
GFR SERPL CREATININE-BSD FRML MDRD: 50 ML/MIN/1.73
GLOBULIN UR ELPH-MCNC: 2.6 GM/DL
GLUCOSE SERPL-MCNC: 103 MG/DL (ref 65–99)
HCT VFR BLD AUTO: 37.3 % (ref 37.5–51)
HGB BLD-MCNC: 11.9 G/DL (ref 13–17.7)
IMM GRANULOCYTES # BLD AUTO: 0.02 10*3/MM3 (ref 0–0.05)
IMM GRANULOCYTES NFR BLD AUTO: 0.2 % (ref 0–0.5)
INR PPP: 2.38 (ref 2–3)
LYMPHOCYTES # BLD AUTO: 1.73 10*3/MM3 (ref 0.7–3.1)
LYMPHOCYTES NFR BLD AUTO: 21 % (ref 19.6–45.3)
MCH RBC QN AUTO: 27.9 PG (ref 26.6–33)
MCHC RBC AUTO-ENTMCNC: 31.9 G/DL (ref 31.5–35.7)
MCV RBC AUTO: 87.6 FL (ref 79–97)
MONOCYTES # BLD AUTO: 0.67 10*3/MM3 (ref 0.1–0.9)
MONOCYTES NFR BLD AUTO: 8.2 % (ref 5–12)
NEUTROPHILS NFR BLD AUTO: 5.7 10*3/MM3 (ref 1.7–7)
NEUTROPHILS NFR BLD AUTO: 69.4 % (ref 42.7–76)
NRBC BLD AUTO-RTO: 0 /100 WBC (ref 0–0.2)
PLATELET # BLD AUTO: 237 10*3/MM3 (ref 140–450)
PMV BLD AUTO: 11.8 FL (ref 6–12)
POTASSIUM SERPL-SCNC: 4 MMOL/L (ref 3.5–5.2)
PROT SERPL-MCNC: 7.1 G/DL (ref 6–8.5)
PROTHROMBIN TIME: 24.8 SECONDS (ref 19.4–28.5)
RBC # BLD AUTO: 4.26 10*6/MM3 (ref 4.14–5.8)
SODIUM SERPL-SCNC: 139 MMOL/L (ref 136–145)
WBC # BLD AUTO: 8.22 10*3/MM3 (ref 3.4–10.8)

## 2021-11-02 PROCEDURE — 85025 COMPLETE CBC W/AUTO DIFF WBC: CPT

## 2021-11-02 PROCEDURE — 36415 COLL VENOUS BLD VENIPUNCTURE: CPT

## 2021-11-02 PROCEDURE — 85610 PROTHROMBIN TIME: CPT

## 2021-11-02 PROCEDURE — 80053 COMPREHEN METABOLIC PANEL: CPT

## 2021-11-03 ENCOUNTER — HOSPITAL ENCOUNTER (OUTPATIENT)
Facility: HOSPITAL | Age: 81
Setting detail: HOSPITAL OUTPATIENT SURGERY
Discharge: HOME | End: 2021-11-03
Attending: INTERNAL MEDICINE | Admitting: INTERNAL MEDICINE

## 2021-11-03 VITALS
RESPIRATION RATE: 18 BRPM | OXYGEN SATURATION: 98 % | BODY MASS INDEX: 31.71 KG/M2 | WEIGHT: 202 LBS | HEART RATE: 89 BPM | SYSTOLIC BLOOD PRESSURE: 117 MMHG | TEMPERATURE: 97.9 F | DIASTOLIC BLOOD PRESSURE: 73 MMHG | HEIGHT: 67 IN

## 2021-11-03 DIAGNOSIS — I25.10 CORONARY ARTERY DISEASE INVOLVING NATIVE CORONARY ARTERY OF NATIVE HEART WITHOUT ANGINA PECTORIS: ICD-10-CM

## 2021-11-03 DIAGNOSIS — I27.20 PULMONARY HYPERTENSION (HCC): ICD-10-CM

## 2021-11-03 LAB
HCT VFR BLDA CALC: 36 % (ref 38–51)
HGB BLDA-MCNC: 12.2 G/DL (ref 12–17)
INR PPP: 2.1 (ref 0.8–1.2)
INR PPP: 2.1 (ref 0.9–1.1)
PROTHROMBIN TIME: 24.3 SECONDS
PROTHROMBIN TIME: 24.3 SECONDS (ref 12.8–15.2)
SAO2 % BLDA: 66 % (ref 95–98)

## 2021-11-03 PROCEDURE — C1894 INTRO/SHEATH, NON-LASER: HCPCS | Performed by: INTERNAL MEDICINE

## 2021-11-03 PROCEDURE — 25010000002 FENTANYL CITRATE (PF) 50 MCG/ML SOLUTION: Performed by: INTERNAL MEDICINE

## 2021-11-03 PROCEDURE — 93461 R&L HRT ART/VENTRICLE ANGIO: CPT | Performed by: INTERNAL MEDICINE

## 2021-11-03 PROCEDURE — 99152 MOD SED SAME PHYS/QHP 5/>YRS: CPT | Performed by: INTERNAL MEDICINE

## 2021-11-03 PROCEDURE — 33285 INSJ SUBQ CAR RHYTHM MNTR: CPT | Performed by: INTERNAL MEDICINE

## 2021-11-03 PROCEDURE — C1764 EVENT RECORDER, CARDIAC: HCPCS | Performed by: INTERNAL MEDICINE

## 2021-11-03 PROCEDURE — 99153 MOD SED SAME PHYS/QHP EA: CPT | Performed by: INTERNAL MEDICINE

## 2021-11-03 PROCEDURE — C1769 GUIDE WIRE: HCPCS | Performed by: INTERNAL MEDICINE

## 2021-11-03 PROCEDURE — 0 IOPAMIDOL PER 1 ML: Performed by: INTERNAL MEDICINE

## 2021-11-03 PROCEDURE — 85014 HEMATOCRIT: CPT

## 2021-11-03 PROCEDURE — 25010000002 MIDAZOLAM PER 1 MG: Performed by: INTERNAL MEDICINE

## 2021-11-03 PROCEDURE — 85018 HEMOGLOBIN: CPT

## 2021-11-03 PROCEDURE — C1760 CLOSURE DEV, VASC: HCPCS | Performed by: INTERNAL MEDICINE

## 2021-11-03 PROCEDURE — 85610 PROTHROMBIN TIME: CPT

## 2021-11-03 DEVICE — ICM LP/RECRD JOT/DX DM4500: Type: IMPLANTABLE DEVICE | Status: FUNCTIONAL

## 2021-11-03 RX ORDER — SODIUM CHLORIDE 9 MG/ML
250 INJECTION, SOLUTION INTRAVENOUS ONCE AS NEEDED
Status: DISCONTINUED | OUTPATIENT
Start: 2021-11-03 | End: 2021-11-03 | Stop reason: HOSPADM

## 2021-11-03 RX ORDER — SODIUM CHLORIDE 0.9 % (FLUSH) 0.9 %
3 SYRINGE (ML) INJECTION EVERY 12 HOURS SCHEDULED
Status: DISCONTINUED | OUTPATIENT
Start: 2021-11-03 | End: 2021-11-03 | Stop reason: HOSPADM

## 2021-11-03 RX ORDER — MIDAZOLAM HYDROCHLORIDE 1 MG/ML
INJECTION INTRAMUSCULAR; INTRAVENOUS AS NEEDED
Status: DISCONTINUED | OUTPATIENT
Start: 2021-11-03 | End: 2021-11-03 | Stop reason: HOSPADM

## 2021-11-03 RX ORDER — SODIUM CHLORIDE 0.9 % (FLUSH) 0.9 %
10 SYRINGE (ML) INJECTION AS NEEDED
Status: DISCONTINUED | OUTPATIENT
Start: 2021-11-03 | End: 2021-11-03 | Stop reason: HOSPADM

## 2021-11-03 RX ORDER — SODIUM CHLORIDE 9 MG/ML
100 INJECTION, SOLUTION INTRAVENOUS CONTINUOUS
Status: DISCONTINUED | OUTPATIENT
Start: 2021-11-03 | End: 2021-11-03 | Stop reason: HOSPADM

## 2021-11-03 RX ORDER — ACETAMINOPHEN 325 MG/1
650 TABLET ORAL EVERY 4 HOURS PRN
Status: DISCONTINUED | OUTPATIENT
Start: 2021-11-03 | End: 2021-11-03 | Stop reason: HOSPADM

## 2021-11-03 RX ORDER — FENTANYL CITRATE 50 UG/ML
INJECTION, SOLUTION INTRAMUSCULAR; INTRAVENOUS AS NEEDED
Status: DISCONTINUED | OUTPATIENT
Start: 2021-11-03 | End: 2021-11-03 | Stop reason: HOSPADM

## 2021-11-03 RX ORDER — LIDOCAINE HYDROCHLORIDE 20 MG/ML
INJECTION, SOLUTION INFILTRATION; PERINEURAL AS NEEDED
Status: DISCONTINUED | OUTPATIENT
Start: 2021-11-03 | End: 2021-11-03 | Stop reason: HOSPADM

## 2021-11-03 RX ORDER — SODIUM CHLORIDE 9 MG/ML
75 INJECTION, SOLUTION INTRAVENOUS CONTINUOUS
Status: DISCONTINUED | OUTPATIENT
Start: 2021-11-03 | End: 2021-11-03 | Stop reason: HOSPADM

## 2021-11-03 RX ORDER — LIDOCAINE HYDROCHLORIDE AND EPINEPHRINE 10; 10 MG/ML; UG/ML
INJECTION, SOLUTION INFILTRATION; PERINEURAL AS NEEDED
Status: DISCONTINUED | OUTPATIENT
Start: 2021-11-03 | End: 2021-11-03 | Stop reason: HOSPADM

## 2021-11-03 RX ADMIN — SODIUM CHLORIDE 75 ML/HR: 9 INJECTION, SOLUTION INTRAVENOUS at 13:37

## 2021-11-03 NOTE — DISCHARGE INSTRUCTIONS
Please review attached discharge instructions for femoral cath after care.[facility]  [place] [address]      Coronary Angiogram (Femoral Approach) After Care     Refer to this sheet in the next few weeks. These instructions provide you with information on caring for yourself after your procedure. Your health care provider may also give you more specific instructions. Your treatment has been planned according to current medical practices, but problems sometimes occur. Call your health care provider if you have any problems or questions after your procedure.      What to Expect After the Procedure:  After your procedure, it is typical to have the following sensations:  · Minor discomfort or tenderness and a small bump at the catheter insertion site. The bump should usually decrease in size and tenderness within 1 to 2 weeks.  · Any bruising will usually fade within 2 to 4 weeks.  Home Care Instructions:  · Do not apply powder or lotion to the site.  · Do not take baths, swim, or use a hot tub until your health care provider approves and the site is completely healed.  · Do not bend, squat, or lift anything over 20 lb (9 kg) or as directed by your health care provider. However, we recommend lifting nothing heavier than a gallon of milk.    · You may shower 24 hours after the procedure. Remove the bandage (dressing) and gently wash the site with plain soap and water. Gently pat the site dry. You may apply a band aid daily for 2 days if desired.    · Inspect the site at least twice daily.  · Increase your fluid intake for the next 2 days.    · Limit your activity for the first 48 hours. .    · Avoid strenuous activity for 1 week or as advised by your physician.    · Follow instructions about when you can drive or return to work as directed by your physician.    · Hold direct pressure over the site when you cough, sneeze, laugh or change positions.  Do this for the next 2 days.    · Do  not operate machinery or power tools for 24 hours.  · A responsible adult should be with you for the first 24 hours after you arrive home. Do not make any important legal decisions or sign legal papers for 24 hours.  Do not drink alcohol for 24 hours.  · Metformin or any medications containing Metformin should not be taken for 48 hours after your procedure.    Call Your Doctor If:  · You have drainage (other than a small amount of blood on the dressing).  · You have chills or a fever > 101.  · You have redness, warmth, swelling(larger than a walnut), or pain at the insertion site  · .You have heavy bleeding from the site. If this happens, hold pressure on the site and call 911.  · You develop chest pain or shortness of breath, feel faint, or pass out.  · You develop pain, discoloration, coldness, numbness, tingling, or severe bruising in the leg that held the catheter.  · You develop bleeding from any other place, such as the bowels.  · You have heavy bleeding from the site, hold pressure on the site for 20 minutes.  If the bleeding stops, apply a fresh bandage and call your cardiologist.  However, if you continue to have bleeding, call 911>    · You have any symptoms of a stroke.  Remember BE FAST  · B-balance. Sudden trouble walking or loss of balance.  · E-eyes.  Sudden changes in how you see or a sudden onset of a very bad headache.   · F-face. Sudden weakness or loss of feeling of the face or facial droop on one side.   · A-arms Sudden weakness or numbness in one arm.  One arm drifts down if they are both held out in front of you. This happens suddenly and usually on one side of the body.  · S-speech.  Sudden trouble speaking, slurred speech or trouble understanding what people are saying.   · T-time  Time to call emergency services.  Write down the symptoms and the time they started.        Make Sure You:  · Understand these instructions.  · Will watch your condition.  · Will get help right away if you are not  doing well or get worse.

## 2021-11-03 NOTE — H&P
Patient Care Team:  Ryan Ramírez MD as PCP - General  [facility] Akbar Neves [facility] as Consulting Physician (Cardiology)  Deam, Andrew Proctor MD as Consulting Physician (Cardiac Electrophysiology)    Chief complaint Presents for loop recorder and right and left heart cath    Subjective     History of Present Illness   Mr Gardner is an 82 yo who presents for a second opinion regarding his overall cardiac condition.  His past medical history is significant for HTN, HLD, and CAD with a CABG in 2002.  He also has a history of paroxysmal atrial fibrillation and  Questionable pulmonary HTN.     He has been having symptoms of fatigue, dyspnea and lightheadedness for months.  He has also noted some mild chest discomfort. He denies any palpitations.  His most recent cardiac evaluation has included an echo 10/2021 which showed mild LV enlargement with a normal EF, normal RV size and function, mild MR with severe LAE, AoV sclerosis with mild AR, moderate TR with an estimated RVSP of 30mmHg.  A nuclear stress test 10/2021 showed a fixed inferior defect with no ischemia.    Review of Systems   Constitutional: Positive for fatigue.   Respiratory: Positive for shortness of breath.    Cardiovascular: Positive for chest pain and palpitations.   All other systems reviewed and are negative.             Objective      Vital Signs  Temp:  [97.9 °F (36.6 °C)] 97.9 °F (36.6 °C)  Heart Rate:  [83-92] 86  Resp:  [16-22] 16  BP: (117-127)/(62-95) 126/73    Physical Exam  Constitutional:       Appearance: Normal appearance.   HENT:      Head: Normocephalic and atraumatic.      Nose: Nose normal.      Mouth/Throat:      Mouth: Mucous membranes are moist.   Eyes:      Extraocular Movements: Extraocular movements intact.      Pupils: Pupils are equal, round, and reactive to light.   Cardiovascular:      Rate and Rhythm: Regular rhythm.   Pulmonary:      Effort: Pulmonary effort is normal.      Breath sounds: Normal breath sounds.    Abdominal:      Palpations: Abdomen is soft.   Musculoskeletal:         General: Normal range of motion.      Cervical back: Neck supple.   Skin:     General: Skin is warm and dry.   Neurological:      General: No focal deficit present.      Mental Status: He is alert and oriented to person, place, and time.   Psychiatric:         Mood and Affect: Mood normal.         Results Review:       Assessment/Plan       CAD (coronary artery disease)    Pulmonary hypertension (HCC)      Assessment & Plan   1. CAD with CABG in 2002 -  recent nuclear stress test showed only a fixed inferior defect with no ischemia but he continues to have chest discomfort and dyspnea.  With history of pulmonary HTN with get right and left heart cath.     2. Paroxysmal AF - [id number] of at least 3(age, HTN, vascular disease), on coumadin, no symptoms of palpitations but he certainly could be having AF causing his symptoms of lightheadedness and dyspnea.  Discussed indications, risks and benefits of loop recorder.     3. HTN - a little high today but is also low at times at home, will continue to monitor     4. ? Pulmonary HTN - right heart cath     5. HLD - statin      Andrew Galvan MD  11/03/21  15:57 EDT

## 2021-11-05 ENCOUNTER — TELEPHONE (OUTPATIENT)
Dept: CARDIOLOGY | Facility: CLINIC | Age: 81
End: 2021-11-05

## 2021-11-05 NOTE — TELEPHONE ENCOUNTER
Cole pt    Patient's daughter called, would like to get results from 11/3/2021. States she got heart catherization results but Dr. aGlvan had ordered a test to check oxygen level in lungs and she would like results.    # [phone]  [place]

## 2021-11-08 NOTE — TELEPHONE ENCOUNTER
I don't know what she is referring to.  I didn't order any test.  She may be confused about a finger pulse ox reading.  DANETTE

## 2021-11-12 ENCOUNTER — ANTICOAGULATION VISIT (OUTPATIENT)
Dept: CARDIOLOGY | Facility: CLINIC | Age: 81
End: 2021-11-12

## 2021-11-12 ENCOUNTER — TELEPHONE (OUTPATIENT)
Dept: CARDIOLOGY | Facility: CLINIC | Age: 81
End: 2021-11-12

## 2021-11-12 DIAGNOSIS — Z79.01 LONG TERM (CURRENT) USE OF ANTICOAGULANTS: ICD-10-CM

## 2021-11-12 DIAGNOSIS — Z79.01 LONG TERM (CURRENT) USE OF ANTICOAGULANTS: Primary | ICD-10-CM

## 2021-11-12 DIAGNOSIS — I48.11 LONGSTANDING PERSISTENT ATRIAL FIBRILLATION (HCC): Primary | ICD-10-CM

## 2021-11-12 NOTE — TELEPHONE ENCOUNTER
Called & spoke with daughter regarding future PT/INRs since Dr. Cummins's departure. She states that Dr. Galvan will be his cardiologist now & will be managing. Asked her to contact Dr. Galvan's office regarding standing order for PT/INRs to be done through their office.

## 2021-11-15 ENCOUNTER — OFFICE VISIT (OUTPATIENT)
Dept: CARDIOLOGY | Facility: CLINIC | Age: 81
End: 2021-11-15

## 2021-11-15 VITALS
HEIGHT: 67 IN | HEART RATE: 73 BPM | BODY MASS INDEX: 32.49 KG/M2 | DIASTOLIC BLOOD PRESSURE: 72 MMHG | SYSTOLIC BLOOD PRESSURE: 120 MMHG | WEIGHT: 207 LBS

## 2021-11-15 DIAGNOSIS — I10 PRIMARY HYPERTENSION: ICD-10-CM

## 2021-11-15 DIAGNOSIS — I48.0 PAROXYSMAL ATRIAL FIBRILLATION (HCC): ICD-10-CM

## 2021-11-15 DIAGNOSIS — I48.0 PAF (PAROXYSMAL ATRIAL FIBRILLATION) (HCC): Primary | ICD-10-CM

## 2021-11-15 DIAGNOSIS — Z95.1 STATUS POST CORONARY ARTERY BYPASS GRAFT: ICD-10-CM

## 2021-11-15 DIAGNOSIS — I25.10 CORONARY ARTERY DISEASE INVOLVING NATIVE CORONARY ARTERY OF NATIVE HEART WITHOUT ANGINA PECTORIS: ICD-10-CM

## 2021-11-15 PROCEDURE — 99214 OFFICE O/P EST MOD 30 MIN: CPT | Performed by: INTERNAL MEDICINE

## 2021-11-15 PROCEDURE — 93291 INTERROG DEV EVAL SCRMS IP: CPT | Performed by: INTERNAL MEDICINE

## 2021-11-15 RX ORDER — AMIODARONE HYDROCHLORIDE 200 MG/1
TABLET ORAL
Qty: 30 TABLET | Refills: 5 | Status: SHIPPED | OUTPATIENT
Start: 2021-11-15 | End: 2022-01-27 | Stop reason: SDUPTHER

## 2021-11-15 NOTE — PROGRESS NOTES
Subjective:     Encounter Date:11/15/2021      Patient ID: Jamin Gardner is a 81 y.o. male.    Chief Complaint:  Chief Complaint   Patient presents with   • Atrial Fibrillation   • Coronary Artery Disease       HPI:  Mr Gardner is an 80 yo who presents for a second opinion regarding his overall cardiac condition.  His past medical history is significant for HTN, HLD, and CAD with a CABG in 2002.  He also has a history of paroxysmal atrial fibrillation and  Questionable pulmonary HTN.     He has been having symptoms of fatigue, dyspnea and lightheadedness for months.  He has also noted some mild chest discomfort. He denies any palpitations.  His most recent cardiac evaluation has included an echo 10/2021 which showed mild LV enlargement with a normal EF, normal RV size and function, mild MR with severe LAE, AoV sclerosis with mild AR, moderate TR with an estimated RVSP of 30mmHg.  A nuclear stress test 10/2021 showed a fixed inferior defect with no ischemia.    He had a cath 11/21 which showed 3 vessel CAD but with patent LIMA-LAD, SVG-ramus, SVG-OM and SVG-RCA.  Pulmonary pressures were normal.  A loop recorder was implanted.    Since then, he has continued to have the same symptoms of fatigue and dyspnea but without palpitations, chest pain or dizziness.       The following portions of the patient's history were reviewed and updated as appropriate: current medications, past family history, past medical history, past social history, past surgical history and problem list.    Problem List:  Patient Active Problem List   Diagnosis   • Atrial fibrillation (CMS/[facility]) [I48.91]   • Long term (current) use of anticoagulants [Z79.01]   • CAD (coronary artery disease)   • Status post coronary artery bypass graft   • Tobacco use   • PAF (paroxysmal atrial fibrillation) ([facility])   • Primary hypertension   • Pulmonary hypertension ([facility])   • Hyperlipidemia       Past Medical History:  Past Medical History:   Diagnosis Date   •  Arrhythmia    • CAD (coronary artery disease)    • CHF (congestive heart failure) ([facility])    • Chronic kidney disease     STAGE 3   • Hyperlipidemia    • PAF (paroxysmal atrial fibrillation) ([facility])    • Primary hypertension    • Pulmonary hypertension ([facility])        Past Surgical History:  Past Surgical History:   Procedure Laterality Date   • CARDIAC CATHETERIZATION     • CARDIAC CATHETERIZATION N/A 11/3/2021    Procedure: Right and Left Heart Cath;  Surgeon: Luis Pastor MD;  Location:  [facility] CATH INVASIVE LOCATION;  Service: Cardiovascular;  Laterality: N/A;   • CARDIAC CATHETERIZATION N/A 11/3/2021    Procedure: Coronary angiography;  Surgeon: Luis Pastor MD;  Location:  [facility] CATH INVASIVE LOCATION;  Service: Cardiovascular;  Laterality: N/A;   • CARDIAC CATHETERIZATION N/A 11/3/2021    Procedure: Left ventriculography;  Surgeon: Luis Pastor MD;  Location:  [facility] CATH INVASIVE LOCATION;  Service: Cardiovascular;  Laterality: N/A;   • CARDIAC CATHETERIZATION  11/3/2021    Procedure: Saphenous Vein Graft;  Surgeon: Luis Pastor MD;  [facility];  Service: Cardiovascular;;   • CARDIAC CATHETERIZATION N/A 11/3/2021    Procedure: Native mammary injection;  Surgeon: Luis Pastor MD;  Location:  [facility] CATH INVASIVE LOCATION;  Service: Cardiovascular;  Laterality: N/A;   • CARDIAC ELECTROPHYSIOLOGY PROCEDURE N/A 11/3/2021    Procedure: LOOP INSERTION DR ALEXANDRE WILL IMPLANT LOOP FIRST THEN CATH;  Surgeon: Luis Pastor MD;  Location: [facility] CATH INVASIVE LOCATION;  Service: Cardiovascular;  Laterality: N/A;   • CORONARY ARTERY BYPASS GRAFT  2002   • EYE SURGERY Right    • REPLACEMENT TOTAL KNEE Right        Social History:  Social History     Socioeconomic History   • Marital status:    Tobacco Use   • Smoking status: Never Smoker   • Smokeless tobacco: Current User     Types: Chew   Substance and Sexual  "Activity   • Alcohol use: Not Currently   • Drug use: Never   • Sexual activity: Defer       Allergies:  No Known Allergies    Immunizations:    There is no immunization history on file for this patient.    ROS:  Review of Systems   Constitutional: Positive for malaise/fatigue.   Cardiovascular: Positive for dyspnea on exertion. Negative for chest pain, irregular heartbeat, leg swelling, near-syncope, orthopnea, palpitations, paroxysmal nocturnal dyspnea and syncope.   Respiratory: Positive for shortness of breath.    All other systems reviewed and are negative.         Objective:         /72   Pulse 73   Ht 170.2 cm (67\")   Wt 93.9 kg (207 lb)   BMI 32.42 kg/m²     Constitutional:       General: Not in acute distress.     Appearance: Well-developed.   Eyes:      General: No scleral icterus.     Conjunctiva/sclera: Conjunctivae normal.      Pupils: Pupils are equal, round, and reactive to light.   HENT:      Head: Normocephalic and atraumatic.   Neck:      Thyroid: No thyromegaly.   Pulmonary:      Effort: Pulmonary effort is normal.      Breath sounds: Normal breath sounds.   Cardiovascular:      Normal rate. Irregularly irregular rhythm.   Abdominal:      General: Bowel sounds are normal.      Palpations: Abdomen is soft.   Musculoskeletal: Normal range of motion.      Cervical back: Normal range of motion. Skin:     General: Skin is warm and dry.   Neurological:      Mental Status: Alert and oriented to person, place, and time.         In-Office Procedure(s):  Procedures  Loop recorder eval interpreted by [facility] 45[address]  Battery MEREDITH    R wave 0.5mv    Events - 81% AF burden    ASCVD RIsk Score::  The ASCVD Risk score (Eaton MICHELLE Jr., et al., 2013) failed to calculate for the following reasons:    The 2013 ASCVD risk score is only valid for ages 40 to 79    Recent Radiology:  Imaging Results (Most Recent)     None          Lab Review:   Admission on 11/03/2021, Discharged on 11/03/2021   Component Date Value "   • Protime 11/03/2021 24.3    • INR 11/03/2021 2.1*   • Protime 11/03/2021 24.3*   • INR 11/03/2021 2.1*   • Hemoglobin 11/03/2021 12.2    • Hematocrit 11/03/2021 36*   • O2 Saturation, Arterial 11/03/2021 66*   Lab on 11/02/2021   Component Date Value   • Glucose 11/02/2021 103*   • BUN 11/02/2021 27*   • Creatinine 11/02/2021 1.37*   • Sodium 11/02/2021 139    • Potassium 11/02/2021 4.0    • Chloride 11/02/2021 104    • CO2 11/02/2021 26.6    • Calcium 11/02/2021 9.2    • Total Protein 11/02/2021 7.1    • Albumin 11/02/2021 4.50    • ALT (SGPT) 11/02/2021 9    • AST (SGOT) 11/02/2021 13    • Alkaline Phosphatase 11/02/2021 54    • Total Bilirubin 11/02/2021 <0.2    • eGFR Non  Amer 11/02/2021 50*   • Globulin 11/02/2021 2.6    • A/G Ratio 11/02/2021 1.7    • BUN/Creatinine Ratio 11/02/2021 19.7    • Anion Gap 11/02/2021 8.4    • Protime 11/02/2021 24.8    • INR 11/02/2021 2.38    • WBC 11/02/2021 8.22    • RBC 11/02/2021 4.26    • Hemoglobin 11/02/2021 11.9*   • Hematocrit 11/02/2021 37.3*   • MCV 11/02/2021 87.6    • MCH 11/02/2021 27.9    • MCHC 11/02/2021 31.9    • RDW 11/02/2021 13.7    • RDW-SD 11/02/2021 43.6    • MPV 11/02/2021 11.8    • Platelets 11/02/2021 237    • Neutrophil % 11/02/2021 69.4    • Lymphocyte % 11/02/2021 21.0    • Monocyte % 11/02/2021 8.2    • Eosinophil % 11/02/2021 1.0    • Basophil % 11/02/2021 0.2    • Immature Grans % 11/02/2021 0.2    • Neutrophils, Absolute 11/02/2021 5.70    • Lymphocytes, Absolute 11/02/2021 1.73    • Monocytes, Absolute 11/02/2021 0.67    • Eosinophils, Absolute 11/02/2021 0.08    • Basophils, Absolute 11/02/2021 0.02    • Immature Grans, Absolute 11/02/2021 0.02    • nRBC 11/02/2021 0.0    Lab on 11/01/2021   Component Date Value   • COVID19 11/01/2021 Not Detected                 Assessment:          Diagnosis Plan   1. PAF (paroxysmal atrial fibrillation) (HCC)     2. Coronary artery disease involving native coronary artery of native heart without  angina pectoris     3. Status post coronary artery bypass graft     4. Primary hypertension     5. Paroxysmal atrial fibrillation (HCC)            Plan:      1. CAD with CABG in 2002 -  cath showed patent grafts, no angina, continue ASA and statin     2. Paroxysmal AF - [id number] of at least 3(age, HTN, vascular disease), on coumadin, no symptoms of palpitations but I think his fatigue and dyspnea are due to his AF.  He is not a good candidate for ablation, will start him on amiodarone 200mg tid for 7 days then 200 mg qd and reduce his dose of coumadin from 4 to 2mg qd and he will get an INR checked in 1 week.  Will see him back in 1 month  And assess his AF burden, he may require cardioversion.     3. HTN - controlled, continue lisinopril/HCTZ     4. HLD - statin    RTC 1 month            Level of Care:                 Andrew Galvan MD  11/15/21  .

## 2021-11-22 ENCOUNTER — ANTICOAGULATION VISIT (OUTPATIENT)
Dept: CARDIOLOGY | Facility: CLINIC | Age: 81
End: 2021-11-22

## 2021-11-22 DIAGNOSIS — I48.11 LONGSTANDING PERSISTENT ATRIAL FIBRILLATION (HCC): Primary | ICD-10-CM

## 2021-11-22 DIAGNOSIS — Z79.01 LONG TERM (CURRENT) USE OF ANTICOAGULANTS: ICD-10-CM

## 2021-11-22 LAB — INR PPP: 1.4

## 2021-11-29 ENCOUNTER — ANTICOAGULATION VISIT (OUTPATIENT)
Dept: CARDIOLOGY | Facility: CLINIC | Age: 81
End: 2021-11-29

## 2021-11-29 DIAGNOSIS — Z79.01 LONG TERM (CURRENT) USE OF ANTICOAGULANTS: ICD-10-CM

## 2021-11-29 DIAGNOSIS — I48.20 CHRONIC ATRIAL FIBRILLATION (HCC): Primary | ICD-10-CM

## 2021-11-29 LAB — INR PPP: 1.9 (ref 2–3)

## 2021-12-06 ENCOUNTER — ANTICOAGULATION VISIT (OUTPATIENT)
Dept: CARDIOLOGY | Facility: CLINIC | Age: 81
End: 2021-12-06

## 2021-12-06 DIAGNOSIS — I48.20 CHRONIC ATRIAL FIBRILLATION (HCC): Primary | ICD-10-CM

## 2021-12-06 DIAGNOSIS — Z79.01 LONG TERM (CURRENT) USE OF ANTICOAGULANTS: ICD-10-CM

## 2021-12-06 LAB — INR PPP: 2.1 (ref 2–3)

## 2021-12-13 ENCOUNTER — OFFICE VISIT (OUTPATIENT)
Dept: CARDIOLOGY | Facility: CLINIC | Age: 81
End: 2021-12-13

## 2021-12-13 VITALS
HEART RATE: 76 BPM | HEIGHT: 67 IN | SYSTOLIC BLOOD PRESSURE: 118 MMHG | WEIGHT: 208 LBS | BODY MASS INDEX: 32.65 KG/M2 | DIASTOLIC BLOOD PRESSURE: 62 MMHG | OXYGEN SATURATION: 99 %

## 2021-12-13 DIAGNOSIS — I10 PRIMARY HYPERTENSION: ICD-10-CM

## 2021-12-13 DIAGNOSIS — I25.10 CORONARY ARTERY DISEASE INVOLVING NATIVE CORONARY ARTERY OF NATIVE HEART WITHOUT ANGINA PECTORIS: ICD-10-CM

## 2021-12-13 DIAGNOSIS — I48.19 PERSISTENT ATRIAL FIBRILLATION (HCC): Primary | ICD-10-CM

## 2021-12-13 DIAGNOSIS — Z95.1 STATUS POST CORONARY ARTERY BYPASS GRAFT: ICD-10-CM

## 2021-12-13 DIAGNOSIS — Z79.01 LONG TERM (CURRENT) USE OF ANTICOAGULANTS: ICD-10-CM

## 2021-12-13 LAB — INR PPP: 2.5 (ref 2–3)

## 2021-12-13 PROCEDURE — 99213 OFFICE O/P EST LOW 20 MIN: CPT | Performed by: INTERNAL MEDICINE

## 2021-12-13 PROCEDURE — 36416 COLLJ CAPILLARY BLOOD SPEC: CPT | Performed by: INTERNAL MEDICINE

## 2021-12-13 PROCEDURE — 93291 INTERROG DEV EVAL SCRMS IP: CPT | Performed by: INTERNAL MEDICINE

## 2021-12-13 PROCEDURE — 85610 PROTHROMBIN TIME: CPT | Performed by: INTERNAL MEDICINE

## 2021-12-13 NOTE — PROGRESS NOTES
Subjective:     Encounter Date:12/13/2021      Patient ID: Jamin Gardner is a 81 y.o. male.    Chief Complaint:  Chief Complaint   Patient presents with   • Follow-up   • Atrial Fibrillation     Still not feeling very good, heart still skips a beat   • Coronary Artery Disease       HPI:  Mr Gardner is an 80 yo who presents for followup of his AFib.  His past medical history is significant for HTN, HLD, and CAD with a CABG in 2002.  He also has a history of paroxysmal atrial fibrillation and  Questionable pulmonary HTN.     He has been having symptoms of fatigue, dyspnea and lightheadedness for months.  He has also noted some mild chest discomfort. He denies any palpitations.  His most recent cardiac evaluation has included an echo 10/2021 which showed mild LV enlargement with a normal EF, normal RV size and function, mild MR with severe LAE, AoV sclerosis with mild AR, moderate TR with an estimated RVSP of 30mmHg.  A nuclear stress test 10/2021 showed a fixed inferior defect with no ischemia.     He had a cath 11/21 which showed 3 vessel CAD but with patent LIMA-LAD, SVG-ramus, SVG-OM and SVG-RCA.  Pulmonary pressures were normal.  A loop recorder was implanted which showed a high burden of AF, often with RVR.  He was placed on amiodarone.    Since then, he has noted an improvement in his fatigue, dyspnea and palpitations.         The following portions of the patient's history were reviewed and updated as appropriate: allergies, current medications, past family history, past medical history, past social history, past surgical history and problem list.    Problem List:  Patient Active Problem List   Diagnosis   • Atrial fibrillation (CMS/[facility]) [I48.91]   • Long term (current) use of anticoagulants [Z79.01]   • CAD (coronary artery disease)   • Status post coronary artery bypass graft   • Tobacco use   • PAF (paroxysmal atrial fibrillation) ([facility])   • Primary hypertension   • Pulmonary hypertension ([facility])   •  Hyperlipidemia       Active Med List:    Current Outpatient Medications:   •  Acetaminophen 500 MG capsule, , Disp: , Rfl:   •  amiodarone (PACERONE) 200 MG tablet, Take 3 tablets daily for 7 days then 1 tablet daily thereafter (Patient taking differently: Take 200 mg by mouth Daily. Take 3 tablets daily for 7 days then 1 tablet daily thereafter), Disp: 30 tablet, Rfl: 5  •  brimonidine (ALPHAGAN) 0.2 % ophthalmic solution, Administer  to both eyes 2 (Two) Times a Day., Disp: , Rfl:   •  lisinopril-hydrochlorothiazide (PRINZIDE,ZESTORETIC) 20-25 MG per tablet, Take 1 tablet by mouth Daily., Disp: 90 tablet, Rfl: 3  •  LUMIGAN 0.01 % ophthalmic drops, PLACE ONE DROP INTO LEFT EYE EVERY NIGHT, Disp: , Rfl:   •  simvastatin (ZOCOR) 20 MG tablet, Take 1 tablet by mouth Every Night., Disp: 90 tablet, Rfl: 3  •  warfarin (COUMADIN) 4 MG tablet, Take 1 tablet by mouth Daily., Disp: 90 tablet, Rfl: 0     Past Medical History:  Past Medical History:   Diagnosis Date   • Arrhythmia    • CAD (coronary artery disease)    • CHF (congestive heart failure) ([facility])    • Chronic kidney disease     STAGE 3   • Hyperlipidemia    • PAF (paroxysmal atrial fibrillation) ([facility])    • Primary hypertension    • Pulmonary hypertension ([facility])        Past Surgical History:  Past Surgical History:   Procedure Laterality Date   • CARDIAC CATHETERIZATION     • CARDIAC CATHETERIZATION N/A 11/3/2021    Procedure: Right and Left Heart Cath;  Surgeon: Luis Pastor MD;  Location: [facility] INVASIVE LOCATION;  Service: Cardiovascular;  Laterality: N/A;   • CARDIAC CATHETERIZATION N/A 11/3/2021    Procedure: Coronary angiography;  Surgeon: Luis Pastor MD;  Location: [facility] CATH INVASIVE LOCATION;  Service: Cardiovascular;  Laterality: N/A;   • CARDIAC CATHETERIZATION N/A 11/3/2021    Procedure: Left ventriculography;  Surgeon: Luis Pastor MD;  Location: [facility] CATH INVASIVE LOCATION;  Service: Cardiovascular;   "Laterality: N/A;   • CARDIAC CATHETERIZATION  11/3/2021    Procedure: Saphenous Vein Graft;  Surgeon: Luis Pastor MD;  Location:  [facility] CATH INVASIVE LOCATION;  Service: Cardiovascular;;   • CARDIAC CATHETERIZATION N/A 11/3/2021    Procedure: Native mammary injection;  Surgeon: Luis Pastor MD;  Location:  [facility] CATH INVASIVE LOCATION;  Service: Cardiovascular;  Laterality: N/A;   • CARDIAC ELECTROPHYSIOLOGY PROCEDURE N/A 11/3/2021    Procedure: LOOP INSERTION DR ALEXANDRE WILL IMPLANT LOOP FIRST THEN CATH;  Surgeon: Luis Pastor MD;  Location:  [facility] CATH INVASIVE LOCATION;  Service: Cardiovascular;  Laterality: N/A;   • CORONARY ARTERY BYPASS GRAFT  2002   • EYE SURGERY Right    • REPLACEMENT TOTAL KNEE Right        Social History:  Social History     Socioeconomic History   • Marital status:    Tobacco Use   • Smoking status: Never Smoker   • Smokeless tobacco: Current User     Types: Chew   Vaping Use   • Vaping Use: Never used   Substance and Sexual Activity   • Alcohol use: Not Currently   • Drug use: Never   • Sexual activity: Defer       Allergies:  No Known Allergies    Immunizations:    There is no immunization history on file for this patient.       Objective:         Review of Systems   Constitutional: Positive for fatigue.   Respiratory: Negative for shortness of breath.    Cardiovascular: Negative for chest pain, palpitations and leg swelling.   All other systems reviewed and are negative.       /62   Pulse 76   Ht 170.2 cm (67\")   Wt 94.3 kg (208 lb)   SpO2 99%   BMI 32.58 kg/m²     Constitutional:       General: Not in acute distress.     Appearance: Well-developed.   Eyes:      General: No scleral icterus.     Conjunctiva/sclera: Conjunctivae normal.      Pupils: Pupils are equal, round, and reactive to light.   HENT:      Head: Normocephalic and atraumatic.   Neck:      Thyroid: No thyromegaly.   Pulmonary:      Effort: Pulmonary effort is " normal.      Breath sounds: Normal breath sounds.   Cardiovascular:      Normal rate. Regularly irregular rhythm.   Abdominal:      General: Bowel sounds are normal.      Palpations: Abdomen is soft.   Musculoskeletal: Normal range of motion.      Cervical back: Normal range of motion. Skin:     General: Skin is warm and dry.   Neurological:      Mental Status: Alert and oriented to person, place, and time.         In-Office Procedure(s):  Procedures  No orders to display      Loop recorder eval interpreted by me  [id number]  Battery MEREDITH    R wave 0.5mv    Events - 69% AF burden    ASCVD RIsk Score::  The ASCVD Risk score (Sandgap MICHELLE Jr., et al., 2013) failed to calculate for the following reasons:    The 2013 ASCVD risk score is only valid for ages 40 to 79    Recent Radiology:          Lab Review:       Recent labs reviewed and interpreted for clinical significance and application          Assessment:          Diagnosis Plan   1. Persistent atrial fibrillation (HCC)  POC INR   2. Long term (current) use of anticoagulants  POC INR   3. Coronary artery disease involving native coronary artery of native heart without angina pectoris     4. Status post coronary artery bypass graft     5. Primary hypertension            Plan:      1. CAD with CABG in 2002 -  cath showed patent grafts, no angina, continue ASA and statin     2. Paroxysmal AF - [id number] of at least 3(age, HTN, vascular disease), on coumadin, no symptoms of palpitations but I think his fatigue and dyspnea are due to his AF.  He is not a good candidate for ablation, symptomatically improved on amiodarone.  Loop recorder shows a 69% AF burden but I suspect there might be some undersensing and he may be in AF continuously.  Will continue amiodarone for now and re-evaluate in 3 months and consider cardioversion at that time.     3. HTN - controlled, continue lisinopril/HCTZ     4. HLD - statin     RTC 3 months      Level of Care:                 Andrew Proctor  MD Cole  12/13/21

## 2021-12-28 ENCOUNTER — ANTICOAGULATION VISIT (OUTPATIENT)
Dept: CARDIOLOGY | Facility: CLINIC | Age: 81
End: 2021-12-28

## 2021-12-28 DIAGNOSIS — I48.0 PAROXYSMAL ATRIAL FIBRILLATION (HCC): Primary | ICD-10-CM

## 2021-12-28 DIAGNOSIS — Z79.01 LONG TERM (CURRENT) USE OF ANTICOAGULANTS: ICD-10-CM

## 2021-12-28 LAB — INR PPP: 2

## 2022-01-03 ENCOUNTER — TELEPHONE (OUTPATIENT)
Dept: CARDIOLOGY | Facility: CLINIC | Age: 82
End: 2022-01-03

## 2022-01-03 NOTE — TELEPHONE ENCOUNTER
Patient has a loop recorder with noted AF. Upon looking at rhythm strips, he is having 1.5 second pause, complex and 2 second pause. Multiple episodes with 1.5 to 2 second pauses within the episode. January 3 at 12:27 am, patient had a 3 second pause.

## 2022-01-27 ENCOUNTER — ANTICOAGULATION VISIT (OUTPATIENT)
Dept: CARDIOLOGY | Facility: CLINIC | Age: 82
End: 2022-01-27

## 2022-01-27 ENCOUNTER — OFFICE VISIT (OUTPATIENT)
Dept: CARDIOLOGY | Facility: CLINIC | Age: 82
End: 2022-01-27

## 2022-01-27 VITALS
HEART RATE: 72 BPM | SYSTOLIC BLOOD PRESSURE: 128 MMHG | HEIGHT: 67 IN | DIASTOLIC BLOOD PRESSURE: 72 MMHG | BODY MASS INDEX: 32.8 KG/M2 | WEIGHT: 209 LBS

## 2022-01-27 DIAGNOSIS — I10 PRIMARY HYPERTENSION: ICD-10-CM

## 2022-01-27 DIAGNOSIS — I48.0 PAF (PAROXYSMAL ATRIAL FIBRILLATION): ICD-10-CM

## 2022-01-27 DIAGNOSIS — Z79.01 LONG TERM (CURRENT) USE OF ANTICOAGULANTS: Primary | ICD-10-CM

## 2022-01-27 DIAGNOSIS — I48.20 CHRONIC ATRIAL FIBRILLATION: ICD-10-CM

## 2022-01-27 DIAGNOSIS — I25.10 CORONARY ARTERY DISEASE INVOLVING NATIVE CORONARY ARTERY OF NATIVE HEART WITHOUT ANGINA PECTORIS: Primary | ICD-10-CM

## 2022-01-27 DIAGNOSIS — Z79.01 LONG TERM (CURRENT) USE OF ANTICOAGULANTS: ICD-10-CM

## 2022-01-27 DIAGNOSIS — I48.0 PAF (PAROXYSMAL ATRIAL FIBRILLATION): Primary | ICD-10-CM

## 2022-01-27 DIAGNOSIS — Z95.1 STATUS POST CORONARY ARTERY BYPASS GRAFT: ICD-10-CM

## 2022-01-27 DIAGNOSIS — Z45.09 ENCOUNTER FOR LOOP RECORDER CHECK: ICD-10-CM

## 2022-01-27 LAB — INR PPP: 2.9 (ref 0.9–1.1)

## 2022-01-27 PROCEDURE — 93291 INTERROG DEV EVAL SCRMS IP: CPT | Performed by: INTERNAL MEDICINE

## 2022-01-27 PROCEDURE — 85610 PROTHROMBIN TIME: CPT | Performed by: INTERNAL MEDICINE

## 2022-01-27 PROCEDURE — 36416 COLLJ CAPILLARY BLOOD SPEC: CPT | Performed by: INTERNAL MEDICINE

## 2022-01-27 PROCEDURE — 99213 OFFICE O/P EST LOW 20 MIN: CPT | Performed by: INTERNAL MEDICINE

## 2022-01-27 RX ORDER — LISINOPRIL AND HYDROCHLOROTHIAZIDE 25; 20 MG/1; MG/1
1 TABLET ORAL DAILY
Qty: 90 TABLET | Refills: 3 | Status: SHIPPED | OUTPATIENT
Start: 2022-01-27 | End: 2022-03-14 | Stop reason: HOSPADM

## 2022-01-27 RX ORDER — SIMVASTATIN 20 MG
20 TABLET ORAL NIGHTLY
Qty: 90 TABLET | Refills: 3 | Status: SHIPPED | OUTPATIENT
Start: 2022-01-27

## 2022-01-27 RX ORDER — WARFARIN SODIUM 4 MG/1
4 TABLET ORAL DAILY
Qty: 90 TABLET | Refills: 3 | Status: SHIPPED | OUTPATIENT
Start: 2022-01-27

## 2022-01-27 RX ORDER — AMIODARONE HYDROCHLORIDE 200 MG/1
200 TABLET ORAL DAILY
Qty: 90 TABLET | Refills: 3 | Status: SHIPPED | OUTPATIENT
Start: 2022-01-27 | End: 2022-03-28 | Stop reason: DRUGHIGH

## 2022-01-27 NOTE — PROGRESS NOTES
Subjective:     Encounter Date:01/27/2022      Patient ID: Jamin Gardner is a 82 y.o. male.    Chief Complaint:  Presents for followup of AF    HPI:  Mr Gardner is an 81 yo who presents for followup of his AFib.  His past medical history is significant for HTN, HLD, and CAD with a CABG in 2002.  He also has a history of paroxysmal atrial fibrillation and questionable pulmonary HTN.     He has been having symptoms of fatigue, dyspnea and lightheadedness for months.  He has also noted some mild chest discomfort. He denies any palpitations.  His most recent cardiac evaluation has included an echo 10/2021 which showed mild LV enlargement with a normal EF, normal RV size and function, mild MR with severe LAE, AoV sclerosis with mild AR, moderate TR with an estimated RVSP of 30mmHg.  A nuclear stress test 10/2021 showed a fixed inferior defect with no ischemia.     He had a cath 11/21 which showed 3 vessel CAD but with patent LIMA-LAD, SVG-ramus, SVG-OM and SVG-RCA.  Pulmonary pressures were normal.  A loop recorder was implanted which showed a high burden of AF, often with RVR.  He was placed on amiodarone.     Since he was last seen, he has been having some weak and lightheaded spells but no syncope.  He is also having some dyspnea but no palpitations.         The following portions of the patient's history were reviewed and updated as appropriate: allergies, current medications, past family history, past medical history, past social history, past surgical history and problem list.    Problem List:  Patient Active Problem List   Diagnosis   • Atrial fibrillation (CMS/[facility]) [I48.91]   • Long term (current) use of anticoagulants [Z79.01]   • CAD (coronary artery disease)   • Status post coronary artery bypass graft   • Tobacco use   • PAF (paroxysmal atrial fibrillation) ([facility])   • Primary hypertension   • Pulmonary hypertension ([facility])   • Hyperlipidemia   • Encounter for loop recorder check       Active Med  List:    Current Outpatient Medications:   •  Acetaminophen 500 MG capsule, , Disp: , Rfl:   •  amiodarone (PACERONE) 200 MG tablet, Take 1 tablet by mouth Daily. Take 3 tablets daily for 7 days then 1 tablet daily thereafter, Disp: 90 tablet, Rfl: 3  •  brimonidine (ALPHAGAN) 0.2 % ophthalmic solution, Administer  to both eyes 2 (Two) Times a Day., Disp: , Rfl:   •  lisinopril-hydrochlorothiazide (PRINZIDE,ZESTORETIC) 20-25 MG per tablet, Take 1 tablet by mouth Daily., Disp: 90 tablet, Rfl: 3  •  LUMIGAN 0.01 % ophthalmic drops, PLACE ONE DROP INTO LEFT EYE EVERY NIGHT, Disp: , Rfl:   •  simvastatin (ZOCOR) 20 MG tablet, Take 1 tablet by mouth Every Night., Disp: 90 tablet, Rfl: 3  •  warfarin (COUMADIN) 4 MG tablet, Take 1 tablet by mouth Daily., Disp: 90 tablet, Rfl: 3     Past Medical History:  Past Medical History:   Diagnosis Date   • Arrhythmia    • CAD (coronary artery disease)    • CHF (congestive heart failure) ([facility])    • Chronic kidney disease     STAGE 3   • Hyperlipidemia    • PAF (paroxysmal atrial fibrillation) ([facility])    • Primary hypertension    • Pulmonary hypertension ([facility])        Past Surgical History:  Past Surgical History:   Procedure Laterality Date   • CARDIAC CATHETERIZATION     • CARDIAC CATHETERIZATION N/A 11/3/2021    Procedure: Right and Left Heart Cath;  Surgeon: Luis Pastor MD;  Location: [facility] CATH INVASIVE LOCATION;  Service: Cardiovascular;  Laterality: N/A;   • CARDIAC CATHETERIZATION N/A 11/3/2021    Procedure: Coronary angiography;  Surgeon: Luis Pastor MD;  Location: [facility] CATH INVASIVE LOCATION;  Service: Cardiovascular;  Laterality: N/A;   • CARDIAC CATHETERIZATION N/A 11/3/2021    Procedure: Left ventriculography;  Surgeon: Luis Pastor MD;  Location: [facility] CATH INVASIVE LOCATION;  Service: Cardiovascular;  Laterality: N/A;   • CARDIAC CATHETERIZATION  11/3/2021    Procedure: Saphenous Vein Graft;  Surgeon: Luis Pastor  "MD Eric;  Location:  [facility] CATH INVASIVE LOCATION;  Service: Cardiovascular;;   • CARDIAC CATHETERIZATION N/A 11/3/2021    Procedure: Native mammary injection;  Surgeon: Luis Pastor MD;  Location:  [facility] CATH INVASIVE LOCATION;  Service: Cardiovascular;  Laterality: N/A;   • CARDIAC ELECTROPHYSIOLOGY PROCEDURE N/A 11/3/2021    Procedure: LOOP INSERTION DR ALEXANDRE WILL IMPLANT LOOP FIRST THEN CATH;  Surgeon: Luis Pastor MD;  Location:  [facility] CATH INVASIVE LOCATION;  Service: Cardiovascular;  Laterality: N/A;   • CORONARY ARTERY BYPASS GRAFT  2002   • EYE SURGERY Right    • REPLACEMENT TOTAL KNEE Right        Social History:  Social History     Socioeconomic History   • Marital status:    Tobacco Use   • Smoking status: Never Smoker   • Smokeless tobacco: Current User     Types: Chew   Vaping Use   • Vaping Use: Never used   Substance and Sexual Activity   • Alcohol use: Not Currently   • Drug use: Never   • Sexual activity: Defer       Allergies:  No Known Allergies    Immunizations:    There is no immunization history on file for this patient.       Objective:         Review of Systems   Constitutional: Positive for fatigue.   Respiratory: Positive for shortness of breath.    Cardiovascular: Negative for chest pain, palpitations and leg swelling.   Neurological: Positive for weakness and light-headedness. Negative for syncope.   All other systems reviewed and are negative.       /72   Pulse 72   Ht 170.2 cm (67\")   Wt 94.8 kg (209 lb)   BMI 32.73 kg/m²     Constitutional:       General: Not in acute distress.     Appearance: Well-developed.   Eyes:      General: No scleral icterus.     Conjunctiva/sclera: Conjunctivae normal.      Pupils: Pupils are equal, round, and reactive to light.   HENT:      Head: Normocephalic and atraumatic.   Neck:      Thyroid: No thyromegaly.   Pulmonary:      Effort: Pulmonary effort is normal.      Breath sounds: Normal breath sounds. "   Cardiovascular:      Normal rate. Irregularly irregular rhythm.   Abdominal:      General: Bowel sounds are normal.      Palpations: Abdomen is soft.   Musculoskeletal: Normal range of motion.      Cervical back: Normal range of motion. Skin:     General: Skin is warm and dry.   Neurological:      Mental Status: Alert and oriented to person, place, and time.         In-Office Procedure(s):  Procedures  No orders to display     Loop recorder eval interpreted by [facility] [id number]  Battery MEREDITH  R wave 0.5mv    Events - AF, some bradycardia     ASCVD RIsk Score::  The ASCVD Risk score (Enoch MICHELLE Ha., et al., 2013) failed to calculate for the following reasons:    The 2013 ASCVD risk score is only valid for ages 40 to 79    Recent Radiology:          Lab Review:       Recent labs reviewed and interpreted for clinical significance and application          Assessment:          Diagnosis Plan   1. Coronary artery disease involving native coronary artery of native heart without angina pectoris     2. Long term (current) use of anticoagulants  warfarin (COUMADIN) 4 MG tablet   3. Chronic atrial fibrillation (HCC)  warfarin (COUMADIN) 4 MG tablet   4. Status post coronary artery bypass graft     5. PAF (paroxysmal atrial fibrillation) (HCC)     6. Primary hypertension     7. Encounter for loop recorder check            Plan:      1. CAD with CABG in 2002 -  cath showed patent grafts, no angina, continue ASA and statin     2. Persistent AF - [id number] of at least 3(age, HTN, vascular disease), on coumadin, no symptoms of palpitations but I think his fatigue and dyspnea are due to his AF.  He is not a good candidate for ablation, he has been on amiodarone for a couple of months and appears to be in continuous AF and symptomatic with fatigue and dyspnea.  He is bradycardic at times.  Will plan for cardioversion.  IF he cannot maintain sinus rhythm, he will need a pacemaker.     3. HTN - controlled, continue lisinopril/HCTZ     4. HLD  - statin     RTC 1 months         Level of Care:                 Andrew Galvan MD  01/27/22

## 2022-01-28 DIAGNOSIS — I48.91 ATRIAL FIBRILLATION, UNSPECIFIED TYPE: ICD-10-CM

## 2022-01-28 DIAGNOSIS — Z01.818 OTHER SPECIFIED PRE-OPERATIVE EXAMINATION: Primary | ICD-10-CM

## 2022-01-28 DIAGNOSIS — Z01.818 PREOP TESTING: Primary | ICD-10-CM

## 2022-01-31 ENCOUNTER — LAB (OUTPATIENT)
Dept: LAB | Facility: HOSPITAL | Age: 82
End: 2022-01-31

## 2022-01-31 DIAGNOSIS — Z01.818 PREOP TESTING: ICD-10-CM

## 2022-01-31 DIAGNOSIS — I48.91 ATRIAL FIBRILLATION, UNSPECIFIED TYPE: ICD-10-CM

## 2022-01-31 LAB
ALBUMIN SERPL-MCNC: 4.5 G/DL (ref 3.5–5.2)
ALBUMIN/GLOB SERPL: 1.7 G/DL
ALP SERPL-CCNC: 43 U/L (ref 39–117)
ALT SERPL W P-5'-P-CCNC: 12 U/L (ref 1–41)
ANION GAP SERPL CALCULATED.3IONS-SCNC: 11.7 MMOL/L (ref 5–15)
AST SERPL-CCNC: 11 U/L (ref 1–40)
BASOPHILS # BLD AUTO: 0.03 10*3/MM3 (ref 0–0.2)
BASOPHILS NFR BLD AUTO: 0.5 % (ref 0–1.5)
BILIRUB SERPL-MCNC: 0.3 MG/DL (ref 0–1.2)
BUN SERPL-MCNC: 32 MG/DL (ref 8–23)
BUN/CREAT SERPL: 19.9 (ref 7–25)
CALCIUM SPEC-SCNC: 9.5 MG/DL (ref 8.6–10.5)
CHLORIDE SERPL-SCNC: 105 MMOL/L (ref 98–107)
CO2 SERPL-SCNC: 25.3 MMOL/L (ref 22–29)
CREAT SERPL-MCNC: 1.61 MG/DL (ref 0.76–1.27)
DEPRECATED RDW RBC AUTO: 45.5 FL (ref 37–54)
EOSINOPHIL # BLD AUTO: 0.08 10*3/MM3 (ref 0–0.4)
EOSINOPHIL NFR BLD AUTO: 1.3 % (ref 0.3–6.2)
ERYTHROCYTE [DISTWIDTH] IN BLOOD BY AUTOMATED COUNT: 14.3 % (ref 12.3–15.4)
GFR SERPL CREATININE-BSD FRML MDRD: 41 ML/MIN/1.73
GLOBULIN UR ELPH-MCNC: 2.6 GM/DL
GLUCOSE SERPL-MCNC: 104 MG/DL (ref 65–99)
HCT VFR BLD AUTO: 37.9 % (ref 37.5–51)
HGB BLD-MCNC: 12.3 G/DL (ref 13–17.7)
IMM GRANULOCYTES # BLD AUTO: 0.02 10*3/MM3 (ref 0–0.05)
IMM GRANULOCYTES NFR BLD AUTO: 0.3 % (ref 0–0.5)
INR PPP: 1.99 (ref 2–3)
LYMPHOCYTES # BLD AUTO: 1.37 10*3/MM3 (ref 0.7–3.1)
LYMPHOCYTES NFR BLD AUTO: 21.7 % (ref 19.6–45.3)
MCH RBC QN AUTO: 28.3 PG (ref 26.6–33)
MCHC RBC AUTO-ENTMCNC: 32.5 G/DL (ref 31.5–35.7)
MCV RBC AUTO: 87.1 FL (ref 79–97)
MONOCYTES # BLD AUTO: 0.53 10*3/MM3 (ref 0.1–0.9)
MONOCYTES NFR BLD AUTO: 8.4 % (ref 5–12)
NEUTROPHILS NFR BLD AUTO: 4.28 10*3/MM3 (ref 1.7–7)
NEUTROPHILS NFR BLD AUTO: 67.8 % (ref 42.7–76)
NRBC BLD AUTO-RTO: 0 /100 WBC (ref 0–0.2)
PLATELET # BLD AUTO: 239 10*3/MM3 (ref 140–450)
PMV BLD AUTO: 11.7 FL (ref 6–12)
POTASSIUM SERPL-SCNC: 4.6 MMOL/L (ref 3.5–5.2)
PROT SERPL-MCNC: 7.1 G/DL (ref 6–8.5)
PROTHROMBIN TIME: 21 SECONDS (ref 19.4–28.5)
RBC # BLD AUTO: 4.35 10*6/MM3 (ref 4.14–5.8)
SARS-COV-2 ORF1AB RESP QL NAA+PROBE: NOT DETECTED
SODIUM SERPL-SCNC: 142 MMOL/L (ref 136–145)
WBC NRBC COR # BLD: 6.31 10*3/MM3 (ref 3.4–10.8)

## 2022-01-31 PROCEDURE — 80053 COMPREHEN METABOLIC PANEL: CPT

## 2022-01-31 PROCEDURE — 85025 COMPLETE CBC W/AUTO DIFF WBC: CPT

## 2022-01-31 PROCEDURE — 85610 PROTHROMBIN TIME: CPT

## 2022-01-31 PROCEDURE — 36415 COLL VENOUS BLD VENIPUNCTURE: CPT

## 2022-01-31 PROCEDURE — U0005 INFEC AGEN DETEC AMPLI PROBE: HCPCS

## 2022-01-31 PROCEDURE — U0004 COV-19 TEST NON-CDC HGH THRU: HCPCS

## 2022-01-31 PROCEDURE — C9803 HOPD COVID-19 SPEC COLLECT: HCPCS

## 2022-02-02 ENCOUNTER — HOSPITAL ENCOUNTER (OUTPATIENT)
Dept: CARDIOLOGY | Facility: HOSPITAL | Age: 82
Discharge: HOME | End: 2022-02-02
Admitting: INTERNAL MEDICINE

## 2022-02-02 VITALS
WEIGHT: 197 LBS | HEIGHT: 67 IN | OXYGEN SATURATION: 98 % | RESPIRATION RATE: 18 BRPM | TEMPERATURE: 99.1 F | DIASTOLIC BLOOD PRESSURE: 74 MMHG | HEART RATE: 60 BPM | SYSTOLIC BLOOD PRESSURE: 120 MMHG | BODY MASS INDEX: 30.92 KG/M2

## 2022-02-02 DIAGNOSIS — I48.0 PAF (PAROXYSMAL ATRIAL FIBRILLATION): ICD-10-CM

## 2022-02-02 LAB
INR PPP: 1.5 (ref 0.8–1.2)
INR PPP: 1.5 (ref 0.9–1.1)
INR PPP: 1.71 (ref 0.9–1.1)
PROTHROMBIN TIME: 18 SECONDS
PROTHROMBIN TIME: 18 SECONDS (ref 12.8–15.2)
PROTHROMBIN TIME: 20.1 SECONDS (ref 11.7–14.2)
QT INTERVAL: 418 MS

## 2022-02-02 PROCEDURE — G2066 INTER DEVC REMOTE 30D: HCPCS | Performed by: INTERNAL MEDICINE

## 2022-02-02 PROCEDURE — 25010000002 MIDAZOLAM PER 1 MG: Performed by: INTERNAL MEDICINE

## 2022-02-02 PROCEDURE — 92960 CARDIOVERSION ELECTRIC EXT: CPT | Performed by: INTERNAL MEDICINE

## 2022-02-02 PROCEDURE — 93005 ELECTROCARDIOGRAM TRACING: CPT | Performed by: INTERNAL MEDICINE

## 2022-02-02 PROCEDURE — 85610 PROTHROMBIN TIME: CPT | Performed by: INTERNAL MEDICINE

## 2022-02-02 PROCEDURE — 93298 REM INTERROG DEV EVAL SCRMS: CPT | Performed by: INTERNAL MEDICINE

## 2022-02-02 PROCEDURE — 93010 ELECTROCARDIOGRAM REPORT: CPT | Performed by: INTERNAL MEDICINE

## 2022-02-02 PROCEDURE — 85610 PROTHROMBIN TIME: CPT

## 2022-02-02 PROCEDURE — 92960 CARDIOVERSION ELECTRIC EXT: CPT

## 2022-02-02 RX ORDER — LIDOCAINE HYDROCHLORIDE 10 MG/ML
0.1 INJECTION, SOLUTION EPIDURAL; INFILTRATION; INTRACAUDAL; PERINEURAL ONCE AS NEEDED
Status: DISCONTINUED | OUTPATIENT
Start: 2022-02-02 | End: 2022-02-03 | Stop reason: HOSPADM

## 2022-02-02 RX ORDER — SODIUM CHLORIDE 0.9 % (FLUSH) 0.9 %
10 SYRINGE (ML) INJECTION AS NEEDED
Status: DISCONTINUED | OUTPATIENT
Start: 2022-02-02 | End: 2022-02-03 | Stop reason: HOSPADM

## 2022-02-02 RX ORDER — MIDAZOLAM HYDROCHLORIDE 1 MG/ML
INJECTION INTRAMUSCULAR; INTRAVENOUS
Status: COMPLETED | OUTPATIENT
Start: 2022-02-02 | End: 2022-02-02

## 2022-02-02 RX ORDER — SODIUM CHLORIDE 0.9 % (FLUSH) 0.9 %
3 SYRINGE (ML) INJECTION EVERY 12 HOURS SCHEDULED
Status: DISCONTINUED | OUTPATIENT
Start: 2022-02-02 | End: 2022-02-03 | Stop reason: HOSPADM

## 2022-02-02 RX ORDER — SODIUM CHLORIDE 9 MG/ML
20 INJECTION, SOLUTION INTRAVENOUS CONTINUOUS
Status: DISCONTINUED | OUTPATIENT
Start: 2022-02-02 | End: 2022-02-03 | Stop reason: HOSPADM

## 2022-02-02 RX ORDER — SODIUM CHLORIDE 9 MG/ML
75 INJECTION, SOLUTION INTRAVENOUS CONTINUOUS
Status: DISCONTINUED | OUTPATIENT
Start: 2022-02-02 | End: 2022-02-03 | Stop reason: HOSPADM

## 2022-02-02 RX ADMIN — MIDAZOLAM 2 MG: 1 INJECTION INTRAMUSCULAR; INTRAVENOUS at 12:51

## 2022-02-02 RX ADMIN — SODIUM CHLORIDE 75 ML/HR: 9 INJECTION, SOLUTION INTRAVENOUS at 12:03

## 2022-02-02 RX ADMIN — METHOHEXITAL SODIUM 30 MG: 500 INJECTION, POWDER, LYOPHILIZED, FOR SOLUTION INTRAMUSCULAR; INTRAVENOUS; RECTAL at 12:54

## 2022-02-02 NOTE — H&P
Patient Care Team:  Ryan Ramírez MD as PCP - General  [facility] MD as Consulting Physician (Cardiology)  Deam, Andrew Proctor MD as Consulting Physician (Cardiac Electrophysiology)    Chief complaint Presents for cardioversion    Subjective     History of Present Illness   Mr Gardner is an 83 yo who presents for followup of his AFib.  His past medical history is significant for HTN, HLD, and CAD with a CABG in 2002.  He also has a history of paroxysmal atrial fibrillation and questionable pulmonary HTN.     He has been having symptoms of fatigue, dyspnea and lightheadedness for months.  He has also noted some mild chest discomfort. He denies any palpitations.  His most recent cardiac evaluation has included an echo 10/2021 which showed mild LV enlargement with a normal EF, normal RV size and function, mild MR with severe LAE, AoV sclerosis with mild AR, moderate TR with an estimated RVSP of 30mmHg.  A nuclear stress test 10/2021 showed a fixed inferior defect with no ischemia.     He had a cath 11/21 which showed 3 vessel CAD but with patent LIMA-LAD, SVG-ramus, SVG-OM and SVG-RCA.  Pulmonary pressures were normal.  A loop recorder was implanted which showed a high burden of AF, often with RVR.  He was placed on amiodarone.     Since he was last seen, he has been having some weak and lightheaded spells but no syncope.  He is also having some dyspnea but no palpitations.       Review of Systems   Constitutional: Positive for fatigue.   Respiratory: Positive for shortness of breath.    Cardiovascular: Negative for chest pain, palpitations and leg swelling.   Neurological: Positive for light-headedness.   All other systems reviewed and are negative.         Past Medical History:   Diagnosis Date   • Arrhythmia    • CAD (coronary artery disease)    • CHF (congestive heart failure) (HCC)    • Chronic kidney disease     STAGE 3   • Hyperlipidemia    • PAF (paroxysmal atrial fibrillation)  (HCC)    • Primary hypertension    • Pulmonary hypertension (HCC)        Objective      Vital Signs       Physical Exam  Constitutional:       Appearance: Normal appearance.   HENT:      Head: Normocephalic and atraumatic.      Mouth/Throat:      Mouth: Mucous membranes are moist.   Eyes:      Extraocular Movements: Extraocular movements intact.      Pupils: Pupils are equal, round, and reactive to light.   Cardiovascular:      Rate and Rhythm: Rhythm irregular.   Pulmonary:      Breath sounds: Normal breath sounds.   Abdominal:      Palpations: Abdomen is soft.   Musculoskeletal:         General: Normal range of motion.      Cervical back: Neck supple.   Skin:     General: Skin is warm and dry.   Neurological:      General: No focal deficit present.      Mental Status: He is alert and oriented to person, place, and time.   Psychiatric:         Mood and Affect: Mood normal.         Results Review:    I reviewed the patient's new clinical results.      Assessment/Plan       * No active hospital problems. *      Assessment & Plan   1. CAD with CABG in 2002 -  cath showed patent grafts, no angina, continue ASA and statin     2. Persistent AF - [id number] of at least 3(age, HTN, vascular disease), on coumadin, no symptoms of palpitations but I think his fatigue and dyspnea are due to his AF.  He is not a good candidate for ablation, he has been on amiodarone for a couple of months and appears to be in continuous AF and symptomatic with fatigue and dyspnea.  He is bradycardic at times.  Will plan for cardioversion.  IF he cannot maintain sinus rhythm, he will need a pacemaker.     3. HTN - controlled, continue lisinopril/HCTZ     4. HLD - statin     RTC 1 months    I discussed the patients findings and my recommendations with patient    Andrew Galvan MD  02/02/22  05:49 EST

## 2022-02-02 NOTE — DISCHARGE SUMMARY
[facility] [place] [facility]    DISCHARGE SUMMARY      Patient Name: Jamin Gardner  :1940  82 y.o.    Date of Admit: 2022  Date of Discharge:  2022    Discharge Diagnosis:  Problems Addressed this Visit        Cardiac and Vasculature    PAF (paroxysmal atrial fibrillation) ([facility])    Relevant Orders    Cardioversion External in Cardiology Department      Diagnoses       Codes Comments    PAF (paroxysmal atrial fibrillation) (HCC)     ICD-10-CM: I48.0  ICD-9-CM: 427.31           Hospital Course:  Patient underwent successful cardioversion for AF.      Procedures Performed  * No procedures listed *       Consults     No orders found from 2022 to 2/3/2022.          Pertinent Test Results:   Results from last 7 days   Lab Units 22  1547   SODIUM mmol/L 142   POTASSIUM mmol/L 4.6   CHLORIDE mmol/L 105   CO2 mmol/L 25.3   BUN mg/dL 32*   CREATININE mg/dL 1.61*   CALCIUM mg/dL 9.5   BILIRUBIN mg/dL 0.3   ALK PHOS U/L 43   ALT (SGPT) U/L 12   AST (SGOT) U/L 11   GLUCOSE mg/dL 104*         @LABRCNT(bnp)@  Results from last 7 days   Lab Units 22  1547   WBC 10*3/mm3 6.31   HEMOGLOBIN g/dL 12.3*   HEMATOCRIT % 37.9   PLATELETS 10*3/mm3 239     Results from last 7 days   Lab Units 22  1214 22  1209 22  1206   INR  1.71* 1.5* 1.5*               Condition on Discharge: stable    Discharge Medications     Discharge Medications      Continue These Medications      Instructions Start Date   Acetaminophen 500 MG capsule   Oral, Every 4 Hours PRN      amiodarone 200 MG tablet  Commonly known as: PACERONE   200 mg, Oral, Daily, Take 3 tablets daily for 7 days then 1 tablet daily thereafter      brimonidine 0.2 % ophthalmic solution  Commonly known as: ALPHAGAN   Both Eyes, 2 Times Daily      lisinopril-hydrochlorothiazide 20-25 MG per tablet  Commonly known as: PRINZIDE,ZESTORETIC   1 tablet, Oral, Daily      Lumigan 0.01 % ophthalmic drops  Generic drug: bimatoprost   PLACE ONE DROP  INTO LEFT EYE EVERY NIGHT      simvastatin 20 MG tablet  Commonly known as: ZOCOR   20 mg, Oral, Nightly      warfarin 4 MG tablet  Commonly known as: COUMADIN   4 mg, Oral, Daily             Discharge Diet:     Activity at Discharge:     Discharge disposition: home    Follow-up Appointments  Future Appointments   Date Time Provider Department Center   2/24/2022  9:15 AM Andrew Galvan MD [facility]   3/28/2022  2:00 PM Andrew Galvan MD [facility]         Test Results Pending at Discharge       Andrew Galvan MD, [facility]    02/02/22  12:43 EST

## 2022-02-03 LAB
MAXIMAL PREDICTED HEART RATE: 138 BPM
STRESS TARGET HR: 117 BPM

## 2022-02-03 NOTE — PROCEDURES
Pre Op Diagnosis - atrial fibrillation    Post Op Diagnosis - same    Procedure - cardioversion    Anesthesia - moderate sedation    Description - After informed consent was obtained the patient was brought to the EP lab in the fasting state.  He was sedated with Versed and Brevital and after an adequate level of sedation was achieved he was synchronously DC cardioverted with 200J which was successful in restoring sinus rhythm. The patient tolerated the procedure well.    The patient received moderate sedation by an RN with me in constant attendance.  The patient had continuous monitoring of his BP, EKG and oximetry.  Duration of sedation was 5 minutes.

## 2022-02-17 ENCOUNTER — OFFICE VISIT (OUTPATIENT)
Dept: CARDIOLOGY | Facility: CLINIC | Age: 82
End: 2022-02-17

## 2022-02-17 VITALS
DIASTOLIC BLOOD PRESSURE: 72 MMHG | WEIGHT: 205 LBS | HEART RATE: 60 BPM | SYSTOLIC BLOOD PRESSURE: 138 MMHG | BODY MASS INDEX: 32.18 KG/M2 | HEIGHT: 67 IN

## 2022-02-17 DIAGNOSIS — I48.0 PAF (PAROXYSMAL ATRIAL FIBRILLATION): Primary | ICD-10-CM

## 2022-02-17 DIAGNOSIS — I10 PRIMARY HYPERTENSION: ICD-10-CM

## 2022-02-17 DIAGNOSIS — Z95.1 STATUS POST CORONARY ARTERY BYPASS GRAFT: ICD-10-CM

## 2022-02-17 DIAGNOSIS — Z45.09 ENCOUNTER FOR LOOP RECORDER CHECK: ICD-10-CM

## 2022-02-17 DIAGNOSIS — Z79.01 LONG TERM (CURRENT) USE OF ANTICOAGULANTS: ICD-10-CM

## 2022-02-17 DIAGNOSIS — I48.11 LONGSTANDING PERSISTENT ATRIAL FIBRILLATION: ICD-10-CM

## 2022-02-17 DIAGNOSIS — I25.10 CORONARY ARTERY DISEASE INVOLVING NATIVE CORONARY ARTERY OF NATIVE HEART WITHOUT ANGINA PECTORIS: ICD-10-CM

## 2022-02-17 LAB — INR PPP: 2 (ref 0.9–1.1)

## 2022-02-17 PROCEDURE — 99213 OFFICE O/P EST LOW 20 MIN: CPT | Performed by: INTERNAL MEDICINE

## 2022-02-17 PROCEDURE — 85610 PROTHROMBIN TIME: CPT | Performed by: INTERNAL MEDICINE

## 2022-02-17 PROCEDURE — 36416 COLLJ CAPILLARY BLOOD SPEC: CPT | Performed by: INTERNAL MEDICINE

## 2022-02-17 NOTE — PROGRESS NOTES
Subjective:     Encounter Date:02/17/2022      Patient ID: Jamin Gardner is a 82 y.o. male.    Chief Complaint:  Chief Complaint   Patient presents with   • Follow-up       HPI:  Mr Gardner is an 83 yo who presents for followup of his AFib.  His past medical history is significant for HTN, HLD, and CAD with a CABG in 2002.  He also has a history of paroxysmal atrial fibrillation and questionable pulmonary HTN.     He has been having symptoms of fatigue, dyspnea and lightheadedness for months.  He has also noted some mild chest discomfort. He denies any palpitations.  His most recent cardiac evaluation has included an echo 10/2021 which showed mild LV enlargement with a normal EF, normal RV size and function, mild MR with severe LAE, AoV sclerosis with mild AR, moderate TR with an estimated RVSP of 30mmHg.  A nuclear stress test 10/2021 showed a fixed inferior defect with no ischemia.     He had a cath 11/21 which showed 3 vessel CAD but with patent LIMA-LAD, SVG-ramus, SVG-OM and SVG-RCA.  Pulmonary pressures were normal.  A loop recorder was implanted which showed a high burden of AF, often with RVR.  He was placed on amiodarone.     He was cardioverted about a month ago and afterwards he felt much better with improved dyspnea and fatigue up until a few days ago.       The following portions of the patient's history were reviewed and updated as appropriate: allergies, current medications, past family history, past medical history, past social history, past surgical history and problem list.    Problem List:  Patient Active Problem List   Diagnosis   • Atrial fibrillation (CMS/[facility]) [I48.91]   • Long term (current) use of anticoagulants [Z79.01]   • CAD (coronary artery disease)   • Status post coronary artery bypass graft   • Tobacco use   • PAF (paroxysmal atrial fibrillation) ([facility])   • Primary hypertension   • Pulmonary hypertension ([facility])   • Hyperlipidemia   • Encounter for loop recorder check       Active  Med List:    Current Outpatient Medications:   •  Acetaminophen 500 MG capsule, Take  by mouth Every 4 (Four) Hours As Needed., Disp: , Rfl:   •  amiodarone (PACERONE) 200 MG tablet, Take 1 tablet by mouth Daily. Take 3 tablets daily for 7 days then 1 tablet daily thereafter, Disp: 90 tablet, Rfl: 3  •  brimonidine (ALPHAGAN) 0.2 % ophthalmic solution, Administer  to both eyes 2 (Two) Times a Day., Disp: , Rfl:   •  lisinopril-hydrochlorothiazide (PRINZIDE,ZESTORETIC) 20-25 MG per tablet, Take 1 tablet by mouth Daily., Disp: 90 tablet, Rfl: 3  •  LUMIGAN 0.01 % ophthalmic drops, PLACE ONE DROP INTO LEFT EYE EVERY NIGHT, Disp: , Rfl:   •  simvastatin (ZOCOR) 20 MG tablet, Take 1 tablet by mouth Every Night., Disp: 90 tablet, Rfl: 3  •  warfarin (COUMADIN) 4 MG tablet, Take 1 tablet by mouth Daily., Disp: 90 tablet, Rfl: 3     Past Medical History:  Past Medical History:   Diagnosis Date   • Arrhythmia    • CAD (coronary artery disease)    • CHF (congestive heart failure) ([facility])    • Chronic kidney disease     STAGE 3   • Hyperlipidemia    • PAF (paroxysmal atrial fibrillation) ([facility])    • Primary hypertension    • Pulmonary hypertension ([facility])        Past Surgical History:  Past Surgical History:   Procedure Laterality Date   • CARDIAC CATHETERIZATION     • CARDIAC CATHETERIZATION N/A 11/3/2021    Procedure: Right and Left Heart Cath;  Surgeon: Luis Pastor MD;  Location: [facility] INVASIVE LOCATION;  Service: Cardiovascular;  Laterality: N/A;   • CARDIAC CATHETERIZATION N/A 11/3/2021    Procedure: Coronary angiography;  Surgeon: Luis Pastor MD;  Location: [facility] CATH INVASIVE LOCATION;  Service: Cardiovascular;  Laterality: N/A;   • CARDIAC CATHETERIZATION N/A 11/3/2021    Procedure: Left ventriculography;  Surgeon: Luis Pastor MD;  Location: [facility] CATH INVASIVE LOCATION;  Service: Cardiovascular;  Laterality: N/A;   • CARDIAC CATHETERIZATION  11/3/2021    Procedure:  "Saphenous Vein Graft;  Surgeon: Luis Pastor MD;  Location:  [facility] CATH INVASIVE LOCATION;  Service: Cardiovascular;;   • CARDIAC CATHETERIZATION N/A 11/3/2021    Procedure: Native mammary injection;  Surgeon: Luis Pastor MD;  Location:  [facility] CATH INVASIVE LOCATION;  Service: Cardiovascular;  Laterality: N/A;   • CARDIAC ELECTROPHYSIOLOGY PROCEDURE N/A 11/3/2021    Procedure: LOOP INSERTION DR ALEXANDRE WILL IMPLANT LOOP FIRST THEN CATH;  Surgeon: Luis Pastor MD;  Location:  [facility] CATH INVASIVE LOCATION;  Service: Cardiovascular;  Laterality: N/A;   • CORONARY ARTERY BYPASS GRAFT  2002   • EYE SURGERY Right    • REPLACEMENT TOTAL KNEE Right        Social History:  Social History     Socioeconomic History   • Marital status:    Tobacco Use   • Smoking status: Never Smoker   • Smokeless tobacco: Current User     Types: Chew   Vaping Use   • Vaping Use: Never used   Substance and Sexual Activity   • Alcohol use: Not Currently   • Drug use: Never   • Sexual activity: Defer       Allergies:  No Known Allergies    Immunizations:    There is no immunization history on file for this patient.       Objective:         Review of Systems   Constitutional: Positive for fatigue.   Respiratory: Positive for shortness of breath.    Cardiovascular: Positive for palpitations. Negative for chest pain and leg swelling.        /72   Pulse 60   Ht 170.2 cm (67\")   Wt 93 kg (205 lb)   BMI 32.11 kg/m²     Constitutional:       General: Not in acute distress.     Appearance: Well-developed.   Eyes:      General: No scleral icterus.     Conjunctiva/sclera: Conjunctivae normal.      Pupils: Pupils are equal, round, and reactive to light.   HENT:      Head: Normocephalic and atraumatic.   Neck:      Thyroid: No thyromegaly.   Pulmonary:      Effort: Pulmonary effort is normal.      Breath sounds: Normal breath sounds.   Cardiovascular:      Normal rate. Irregularly irregular rhythm. "   Abdominal:      General: Bowel sounds are normal.      Palpations: Abdomen is soft.   Musculoskeletal: Normal range of motion.      Cervical back: Normal range of motion. Skin:     General: Skin is warm and dry.   Neurological:      Mental Status: Alert and oriented to person, place, and time.         In-Office Procedure(s):  Procedures  No orders to display      Loop recorder eval interpreted by me  SJM 4300  Battery MEREDITH  R wave 0.5mv    Events - AF 53% burden    ASCVD RIsk Score::  The ASCVD Risk score (Enoch MICHELLE Jr., et al., 2013) failed to calculate for the following reasons:    The 2013 ASCVD risk score is only valid for ages 40 to 79    Recent Radiology:          Lab Review:       Recent labs reviewed and interpreted for clinical significance and application          Assessment:          Diagnosis Plan   1. PAF (paroxysmal atrial fibrillation) (HCC)  POC INR   2. Status post coronary artery bypass graft  POC INR   3. Coronary artery disease involving native coronary artery of native heart without angina pectoris  POC INR   4. Encounter for loop recorder check  POC INR   5. Primary hypertension  POC INR   6. Longstanding persistent atrial fibrillation (HCC)  POC INR   7. Long term (current) use of anticoagulants  POC INR          Plan:      1. CAD with CABG in 2002 -  cath showed patent grafts, no angina, continue ASA and statin     2. Persistent AF - [id number] of at least 3(age, HTN, vascular disease), on coumadin, no symptoms of palpitation.  He had siignificant symptomatic improvement while in sinus rhythm.  Discussed indications, risks and benefits of AF ablation and he would like to proceed.     3. HTN - controlled, continue lisinopril/HCTZ     4. HLD - statin     RTC post ablation       Level of Care:                 Andrew Galvan MD  02/17/22

## 2022-02-22 DIAGNOSIS — I48.19 PERSISTENT ATRIAL FIBRILLATION: Primary | ICD-10-CM

## 2022-03-08 ENCOUNTER — TRANSCRIBE ORDERS (OUTPATIENT)
Dept: ADMINISTRATIVE | Facility: HOSPITAL | Age: 82
End: 2022-03-08

## 2022-03-08 DIAGNOSIS — Z01.818 OTHER SPECIFIED PRE-OPERATIVE EXAMINATION: Primary | ICD-10-CM

## 2022-03-09 ENCOUNTER — LAB (OUTPATIENT)
Dept: LAB | Facility: HOSPITAL | Age: 82
End: 2022-03-09

## 2022-03-09 DIAGNOSIS — Z01.818 OTHER SPECIFIED PRE-OPERATIVE EXAMINATION: ICD-10-CM

## 2022-03-09 DIAGNOSIS — I48.0 PAROXYSMAL ATRIAL FIBRILLATION: ICD-10-CM

## 2022-03-09 DIAGNOSIS — Z01.818 PRE-OP TESTING: ICD-10-CM

## 2022-03-09 DIAGNOSIS — Z01.818 PRE-OP TESTING: Primary | ICD-10-CM

## 2022-03-09 LAB
ALBUMIN SERPL-MCNC: 4.8 G/DL (ref 3.5–5.2)
ALBUMIN/GLOB SERPL: 1.8 G/DL
ALP SERPL-CCNC: 44 U/L (ref 39–117)
ALT SERPL W P-5'-P-CCNC: 12 U/L (ref 1–41)
ANION GAP SERPL CALCULATED.3IONS-SCNC: 13.6 MMOL/L (ref 5–15)
AST SERPL-CCNC: 16 U/L (ref 1–40)
BASOPHILS # BLD AUTO: 0.03 10*3/MM3 (ref 0–0.2)
BASOPHILS NFR BLD AUTO: 0.4 % (ref 0–1.5)
BILIRUB SERPL-MCNC: 0.3 MG/DL (ref 0–1.2)
BUN SERPL-MCNC: 29 MG/DL (ref 8–23)
BUN/CREAT SERPL: 19.2 (ref 7–25)
CALCIUM SPEC-SCNC: 9.6 MG/DL (ref 8.6–10.5)
CHLORIDE SERPL-SCNC: 102 MMOL/L (ref 98–107)
CO2 SERPL-SCNC: 23.4 MMOL/L (ref 22–29)
CREAT SERPL-MCNC: 1.51 MG/DL (ref 0.76–1.27)
DEPRECATED RDW RBC AUTO: 49.3 FL (ref 37–54)
EGFRCR SERPLBLD CKD-EPI 2021: 45.8 ML/MIN/1.73
EOSINOPHIL # BLD AUTO: 0.04 10*3/MM3 (ref 0–0.4)
EOSINOPHIL NFR BLD AUTO: 0.6 % (ref 0.3–6.2)
ERYTHROCYTE [DISTWIDTH] IN BLOOD BY AUTOMATED COUNT: 15.8 % (ref 12.3–15.4)
GLOBULIN UR ELPH-MCNC: 2.6 GM/DL
GLUCOSE SERPL-MCNC: 91 MG/DL (ref 65–99)
HCT VFR BLD AUTO: 40.5 % (ref 37.5–51)
HGB BLD-MCNC: 12.9 G/DL (ref 13–17.7)
IMM GRANULOCYTES # BLD AUTO: 0.01 10*3/MM3 (ref 0–0.05)
IMM GRANULOCYTES NFR BLD AUTO: 0.1 % (ref 0–0.5)
INR PPP: 3.15 (ref 2–3)
LYMPHOCYTES # BLD AUTO: 1.21 10*3/MM3 (ref 0.7–3.1)
LYMPHOCYTES NFR BLD AUTO: 18.1 % (ref 19.6–45.3)
MCH RBC QN AUTO: 27.7 PG (ref 26.6–33)
MCHC RBC AUTO-ENTMCNC: 31.9 G/DL (ref 31.5–35.7)
MCV RBC AUTO: 86.9 FL (ref 79–97)
MONOCYTES # BLD AUTO: 0.61 10*3/MM3 (ref 0.1–0.9)
MONOCYTES NFR BLD AUTO: 9.1 % (ref 5–12)
NEUTROPHILS NFR BLD AUTO: 4.79 10*3/MM3 (ref 1.7–7)
NEUTROPHILS NFR BLD AUTO: 71.7 % (ref 42.7–76)
NRBC BLD AUTO-RTO: 0 /100 WBC (ref 0–0.2)
PLATELET # BLD AUTO: 228 10*3/MM3 (ref 140–450)
PMV BLD AUTO: 12.1 FL (ref 6–12)
POTASSIUM SERPL-SCNC: 4.1 MMOL/L (ref 3.5–5.2)
PROT SERPL-MCNC: 7.4 G/DL (ref 6–8.5)
PROTHROMBIN TIME: 32.1 SECONDS (ref 19.4–28.5)
RBC # BLD AUTO: 4.66 10*6/MM3 (ref 4.14–5.8)
SARS-COV-2 ORF1AB RESP QL NAA+PROBE: NOT DETECTED
SODIUM SERPL-SCNC: 139 MMOL/L (ref 136–145)
WBC NRBC COR # BLD: 6.69 10*3/MM3 (ref 3.4–10.8)

## 2022-03-09 PROCEDURE — U0005 INFEC AGEN DETEC AMPLI PROBE: HCPCS

## 2022-03-09 PROCEDURE — U0004 COV-19 TEST NON-CDC HGH THRU: HCPCS

## 2022-03-09 PROCEDURE — 80053 COMPREHEN METABOLIC PANEL: CPT

## 2022-03-09 PROCEDURE — C9803 HOPD COVID-19 SPEC COLLECT: HCPCS

## 2022-03-09 PROCEDURE — 85025 COMPLETE CBC W/AUTO DIFF WBC: CPT

## 2022-03-09 PROCEDURE — 36415 COLL VENOUS BLD VENIPUNCTURE: CPT

## 2022-03-09 PROCEDURE — 85610 PROTHROMBIN TIME: CPT

## 2022-03-11 ENCOUNTER — HOSPITAL ENCOUNTER (INPATIENT)
Facility: HOSPITAL | Age: 82
LOS: 1 days | Discharge: HOME | End: 2022-03-14
Attending: INTERNAL MEDICINE | Admitting: INTERNAL MEDICINE

## 2022-03-11 ENCOUNTER — ANESTHESIA EVENT (OUTPATIENT)
Dept: CARDIOLOGY | Facility: HOSPITAL | Age: 82
End: 2022-03-11

## 2022-03-11 ENCOUNTER — ANESTHESIA (OUTPATIENT)
Dept: CARDIOLOGY | Facility: HOSPITAL | Age: 82
End: 2022-03-11

## 2022-03-11 ENCOUNTER — HOSPITAL ENCOUNTER (OUTPATIENT)
Dept: CARDIOLOGY | Facility: HOSPITAL | Age: 82
Discharge: HOME | End: 2022-03-11

## 2022-03-11 DIAGNOSIS — I48.19 PERSISTENT ATRIAL FIBRILLATION: ICD-10-CM

## 2022-03-11 LAB
ACT BLD: 362 SECONDS (ref 82–152)
ACT BLD: 362 SECONDS (ref 82–152)
ACT BLD: 369 SECONDS (ref 82–152)
ACT BLD: 398 SECONDS (ref 82–152)
GLUCOSE BLDC GLUCOMTR-MCNC: 164 MG/DL (ref 70–130)
INR PPP: 2.84 (ref 0.9–1.1)
INR PPP: 2.9 (ref 0.8–1.2)
INR PPP: 3.04 (ref 0.9–1.1)
MAXIMAL PREDICTED HEART RATE: 138 BPM
PROTHROMBIN TIME: 30 SECONDS (ref 11.7–14.2)
PROTHROMBIN TIME: 31.7 SECONDS (ref 11.7–14.2)
PROTHROMBIN TIME: 33 SECONDS (ref 12.8–15.2)
QT INTERVAL: 424 MS
QT INTERVAL: 435 MS
STRESS TARGET HR: 117 BPM

## 2022-03-11 PROCEDURE — S0260 H&P FOR SURGERY: HCPCS | Performed by: INTERNAL MEDICINE

## 2022-03-11 PROCEDURE — 85610 PROTHROMBIN TIME: CPT

## 2022-03-11 PROCEDURE — C1731 CATH, EP, 20 OR MORE ELEC: HCPCS | Performed by: INTERNAL MEDICINE

## 2022-03-11 PROCEDURE — C1894 INTRO/SHEATH, NON-LASER: HCPCS | Performed by: INTERNAL MEDICINE

## 2022-03-11 PROCEDURE — 02583ZZ DESTRUCTION OF CONDUCTION MECHANISM, PERCUTANEOUS APPROACH: ICD-10-PCS | Performed by: INTERNAL MEDICINE

## 2022-03-11 PROCEDURE — 25010000002 DEXAMETHASONE PER 1 MG: Performed by: NURSE ANESTHETIST, CERTIFIED REGISTERED

## 2022-03-11 PROCEDURE — 93005 ELECTROCARDIOGRAM TRACING: CPT | Performed by: INTERNAL MEDICINE

## 2022-03-11 PROCEDURE — 93010 ELECTROCARDIOGRAM REPORT: CPT | Performed by: INTERNAL MEDICINE

## 2022-03-11 PROCEDURE — 25010000002 FUROSEMIDE PER 20 MG: Performed by: INTERNAL MEDICINE

## 2022-03-11 PROCEDURE — 85347 COAGULATION TIME ACTIVATED: CPT

## 2022-03-11 PROCEDURE — 93655 ICAR CATH ABLTJ DSCRT ARRHYT: CPT | Performed by: INTERNAL MEDICINE

## 2022-03-11 PROCEDURE — C1760 CLOSURE DEV, VASC: HCPCS | Performed by: INTERNAL MEDICINE

## 2022-03-11 PROCEDURE — C1730 CATH, EP, 19 OR FEW ELECT: HCPCS | Performed by: INTERNAL MEDICINE

## 2022-03-11 PROCEDURE — C1893 INTRO/SHEATH, FIXED,NON-PEEL: HCPCS | Performed by: INTERNAL MEDICINE

## 2022-03-11 PROCEDURE — 25010000002 PROPOFOL 10 MG/ML EMULSION: Performed by: NURSE ANESTHETIST, CERTIFIED REGISTERED

## 2022-03-11 PROCEDURE — G0378 HOSPITAL OBSERVATION PER HR: HCPCS

## 2022-03-11 PROCEDURE — 25010000002 HEPARIN (PORCINE) PER 1000 UNITS: Performed by: INTERNAL MEDICINE

## 2022-03-11 PROCEDURE — 25010000002 FENTANYL CITRATE (PF) 50 MCG/ML SOLUTION: Performed by: NURSE ANESTHETIST, CERTIFIED REGISTERED

## 2022-03-11 PROCEDURE — 25010000002 PHENYLEPHRINE 10 MG/ML SOLUTION: Performed by: NURSE ANESTHETIST, CERTIFIED REGISTERED

## 2022-03-11 PROCEDURE — 25010000002 MIDAZOLAM PER 1 MG: Performed by: ANESTHESIOLOGY

## 2022-03-11 PROCEDURE — C1732 CATH, EP, DIAG/ABL, 3D/VECT: HCPCS | Performed by: INTERNAL MEDICINE

## 2022-03-11 PROCEDURE — 93312 ECHO TRANSESOPHAGEAL: CPT

## 2022-03-11 PROCEDURE — C2630 CATH, EP, COOL-TIP: HCPCS | Performed by: INTERNAL MEDICINE

## 2022-03-11 PROCEDURE — 85610 PROTHROMBIN TIME: CPT | Performed by: INTERNAL MEDICINE

## 2022-03-11 PROCEDURE — B244ZZ4 ULTRASONOGRAPHY OF RIGHT HEART, TRANSESOPHAGEAL: ICD-10-PCS | Performed by: INTERNAL MEDICINE

## 2022-03-11 PROCEDURE — 4A0234Z MEASUREMENT OF CARDIAC ELECTRICAL ACTIVITY, PERCUTANEOUS APPROACH: ICD-10-PCS | Performed by: INTERNAL MEDICINE

## 2022-03-11 PROCEDURE — 93312 ECHO TRANSESOPHAGEAL: CPT | Performed by: INTERNAL MEDICINE

## 2022-03-11 PROCEDURE — 93321 DOPPLER ECHO F-UP/LMTD STD: CPT | Performed by: INTERNAL MEDICINE

## 2022-03-11 PROCEDURE — 02K83ZZ MAP CONDUCTION MECHANISM, PERCUTANEOUS APPROACH: ICD-10-PCS | Performed by: INTERNAL MEDICINE

## 2022-03-11 PROCEDURE — 93656 COMPRE EP EVAL ABLTJ ATR FIB: CPT | Performed by: INTERNAL MEDICINE

## 2022-03-11 PROCEDURE — C1766 INTRO/SHEATH,STRBLE,NON-PEEL: HCPCS | Performed by: INTERNAL MEDICINE

## 2022-03-11 PROCEDURE — 25010000002 NEOSTIGMINE 5 MG/10ML SOLUTION: Performed by: ANESTHESIOLOGY

## 2022-03-11 PROCEDURE — 93325 DOPPLER ECHO COLOR FLOW MAPG: CPT | Performed by: INTERNAL MEDICINE

## 2022-03-11 PROCEDURE — C1759 CATH, INTRA ECHOCARDIOGRAPHY: HCPCS | Performed by: INTERNAL MEDICINE

## 2022-03-11 PROCEDURE — 93325 DOPPLER ECHO COLOR FLOW MAPG: CPT

## 2022-03-11 PROCEDURE — 82962 GLUCOSE BLOOD TEST: CPT

## 2022-03-11 PROCEDURE — 93321 DOPPLER ECHO F-UP/LMTD STD: CPT

## 2022-03-11 PROCEDURE — C1769 GUIDE WIRE: HCPCS | Performed by: INTERNAL MEDICINE

## 2022-03-11 PROCEDURE — 25010000002 PROTAMINE SULFATE PER 10 MG: Performed by: INTERNAL MEDICINE

## 2022-03-11 RX ORDER — HEPARIN SODIUM 1000 [USP'U]/ML
INJECTION, SOLUTION INTRAVENOUS; SUBCUTANEOUS AS NEEDED
Status: DISCONTINUED | OUTPATIENT
Start: 2022-03-11 | End: 2022-03-11 | Stop reason: HOSPADM

## 2022-03-11 RX ORDER — EPHEDRINE SULFATE 50 MG/ML
5 INJECTION, SOLUTION INTRAVENOUS ONCE AS NEEDED
Status: DISCONTINUED | OUTPATIENT
Start: 2022-03-11 | End: 2022-03-11 | Stop reason: HOSPADM

## 2022-03-11 RX ORDER — PROPOFOL 10 MG/ML
VIAL (ML) INTRAVENOUS AS NEEDED
Status: DISCONTINUED | OUTPATIENT
Start: 2022-03-11 | End: 2022-03-11 | Stop reason: SURG

## 2022-03-11 RX ORDER — PROTAMINE SULFATE 10 MG/ML
INJECTION, SOLUTION INTRAVENOUS AS NEEDED
Status: DISCONTINUED | OUTPATIENT
Start: 2022-03-11 | End: 2022-03-11 | Stop reason: HOSPADM

## 2022-03-11 RX ORDER — DIPHENHYDRAMINE HYDROCHLORIDE 50 MG/ML
12.5 INJECTION INTRAMUSCULAR; INTRAVENOUS
Status: DISCONTINUED | OUTPATIENT
Start: 2022-03-11 | End: 2022-03-11 | Stop reason: HOSPADM

## 2022-03-11 RX ORDER — LATANOPROST 50 UG/ML
1 SOLUTION/ DROPS OPHTHALMIC NIGHTLY
Status: DISCONTINUED | OUTPATIENT
Start: 2022-03-12 | End: 2022-03-14 | Stop reason: HOSPADM

## 2022-03-11 RX ORDER — GLYCOPYRROLATE 0.2 MG/ML
INJECTION INTRAMUSCULAR; INTRAVENOUS AS NEEDED
Status: DISCONTINUED | OUTPATIENT
Start: 2022-03-11 | End: 2022-03-11 | Stop reason: SURG

## 2022-03-11 RX ORDER — PHENYLEPHRINE HYDROCHLORIDE 10 MG/ML
INJECTION INTRAVENOUS AS NEEDED
Status: DISCONTINUED | OUTPATIENT
Start: 2022-03-11 | End: 2022-03-11 | Stop reason: SURG

## 2022-03-11 RX ORDER — FLUMAZENIL 0.1 MG/ML
0.2 INJECTION INTRAVENOUS AS NEEDED
Status: DISCONTINUED | OUTPATIENT
Start: 2022-03-11 | End: 2022-03-11 | Stop reason: HOSPADM

## 2022-03-11 RX ORDER — SODIUM CHLORIDE 9 MG/ML
75 INJECTION, SOLUTION INTRAVENOUS CONTINUOUS
Status: DISCONTINUED | OUTPATIENT
Start: 2022-03-11 | End: 2022-03-11

## 2022-03-11 RX ORDER — FAMOTIDINE 10 MG/ML
20 INJECTION, SOLUTION INTRAVENOUS ONCE
Status: COMPLETED | OUTPATIENT
Start: 2022-03-11 | End: 2022-03-11

## 2022-03-11 RX ORDER — SODIUM CHLORIDE 0.9 % (FLUSH) 0.9 %
3 SYRINGE (ML) INJECTION EVERY 12 HOURS SCHEDULED
Status: DISCONTINUED | OUTPATIENT
Start: 2022-03-11 | End: 2022-03-11 | Stop reason: HOSPADM

## 2022-03-11 RX ORDER — ROCURONIUM BROMIDE 10 MG/ML
INJECTION, SOLUTION INTRAVENOUS AS NEEDED
Status: DISCONTINUED | OUTPATIENT
Start: 2022-03-11 | End: 2022-03-11 | Stop reason: SURG

## 2022-03-11 RX ORDER — SODIUM CHLORIDE 0.9 % (FLUSH) 0.9 %
10 SYRINGE (ML) INJECTION AS NEEDED
Status: DISCONTINUED | OUTPATIENT
Start: 2022-03-11 | End: 2022-03-11 | Stop reason: HOSPADM

## 2022-03-11 RX ORDER — DIPHENHYDRAMINE HCL 25 MG
25 CAPSULE ORAL
Status: DISCONTINUED | OUTPATIENT
Start: 2022-03-11 | End: 2022-03-11 | Stop reason: HOSPADM

## 2022-03-11 RX ORDER — FENTANYL CITRATE 50 UG/ML
50 INJECTION, SOLUTION INTRAMUSCULAR; INTRAVENOUS
Status: DISCONTINUED | OUTPATIENT
Start: 2022-03-11 | End: 2022-03-14 | Stop reason: HOSPADM

## 2022-03-11 RX ORDER — HYDROCODONE BITARTRATE AND ACETAMINOPHEN 7.5; 325 MG/1; MG/1
1 TABLET ORAL ONCE AS NEEDED
Status: DISCONTINUED | OUTPATIENT
Start: 2022-03-11 | End: 2022-03-11 | Stop reason: HOSPADM

## 2022-03-11 RX ORDER — BRIMONIDINE TARTRATE 2 MG/ML
1 SOLUTION/ DROPS OPHTHALMIC 2 TIMES DAILY
Status: DISCONTINUED | OUTPATIENT
Start: 2022-03-11 | End: 2022-03-14 | Stop reason: HOSPADM

## 2022-03-11 RX ORDER — LABETALOL HYDROCHLORIDE 5 MG/ML
5 INJECTION, SOLUTION INTRAVENOUS
Status: DISCONTINUED | OUTPATIENT
Start: 2022-03-11 | End: 2022-03-11 | Stop reason: HOSPADM

## 2022-03-11 RX ORDER — FUROSEMIDE 10 MG/ML
INJECTION INTRAMUSCULAR; INTRAVENOUS AS NEEDED
Status: DISCONTINUED | OUTPATIENT
Start: 2022-03-11 | End: 2022-03-11 | Stop reason: HOSPADM

## 2022-03-11 RX ORDER — NALOXONE HCL 0.4 MG/ML
0.2 VIAL (ML) INJECTION AS NEEDED
Status: DISCONTINUED | OUTPATIENT
Start: 2022-03-11 | End: 2022-03-11 | Stop reason: HOSPADM

## 2022-03-11 RX ORDER — HYDRALAZINE HYDROCHLORIDE 20 MG/ML
5 INJECTION INTRAMUSCULAR; INTRAVENOUS
Status: DISCONTINUED | OUTPATIENT
Start: 2022-03-11 | End: 2022-03-11 | Stop reason: HOSPADM

## 2022-03-11 RX ORDER — ACETAMINOPHEN 325 MG/1
650 TABLET ORAL EVERY 4 HOURS PRN
Status: DISCONTINUED | OUTPATIENT
Start: 2022-03-11 | End: 2022-03-14 | Stop reason: HOSPADM

## 2022-03-11 RX ORDER — OXYCODONE HYDROCHLORIDE AND ACETAMINOPHEN 5; 325 MG/1; MG/1
1 TABLET ORAL EVERY 4 HOURS PRN
Status: DISCONTINUED | OUTPATIENT
Start: 2022-03-11 | End: 2022-03-14 | Stop reason: HOSPADM

## 2022-03-11 RX ORDER — SODIUM CHLORIDE 0.9 % (FLUSH) 0.9 %
3 SYRINGE (ML) INJECTION EVERY 12 HOURS SCHEDULED
Status: DISCONTINUED | OUTPATIENT
Start: 2022-03-11 | End: 2022-03-14 | Stop reason: HOSPADM

## 2022-03-11 RX ORDER — MIDAZOLAM HYDROCHLORIDE 1 MG/ML
0.5 INJECTION INTRAMUSCULAR; INTRAVENOUS
Status: DISCONTINUED | OUTPATIENT
Start: 2022-03-11 | End: 2022-03-11 | Stop reason: HOSPADM

## 2022-03-11 RX ORDER — LIDOCAINE HYDROCHLORIDE AND EPINEPHRINE 10; 10 MG/ML; UG/ML
INJECTION, SOLUTION INFILTRATION; PERINEURAL AS NEEDED
Status: DISCONTINUED | OUTPATIENT
Start: 2022-03-11 | End: 2022-03-11 | Stop reason: HOSPADM

## 2022-03-11 RX ORDER — AMIODARONE HYDROCHLORIDE 200 MG/1
200 TABLET ORAL DAILY
Status: DISCONTINUED | OUTPATIENT
Start: 2022-03-11 | End: 2022-03-14 | Stop reason: HOSPADM

## 2022-03-11 RX ORDER — OXYCODONE AND ACETAMINOPHEN 7.5; 325 MG/1; MG/1
1 TABLET ORAL EVERY 4 HOURS PRN
Status: DISCONTINUED | OUTPATIENT
Start: 2022-03-11 | End: 2022-03-11 | Stop reason: HOSPADM

## 2022-03-11 RX ORDER — LIDOCAINE HYDROCHLORIDE 10 MG/ML
0.5 INJECTION, SOLUTION EPIDURAL; INFILTRATION; INTRACAUDAL; PERINEURAL ONCE AS NEEDED
Status: DISCONTINUED | OUTPATIENT
Start: 2022-03-11 | End: 2022-03-14 | Stop reason: HOSPADM

## 2022-03-11 RX ORDER — PANTOPRAZOLE SODIUM 40 MG/1
40 TABLET, DELAYED RELEASE ORAL
Status: DISCONTINUED | OUTPATIENT
Start: 2022-03-12 | End: 2022-03-14 | Stop reason: HOSPADM

## 2022-03-11 RX ORDER — WARFARIN SODIUM 4 MG/1
4 TABLET ORAL DAILY
Status: DISCONTINUED | OUTPATIENT
Start: 2022-03-11 | End: 2022-03-11

## 2022-03-11 RX ORDER — HEPARIN SODIUM 10000 [USP'U]/100ML
INJECTION, SOLUTION INTRAVENOUS CONTINUOUS PRN
Status: DISCONTINUED | OUTPATIENT
Start: 2022-03-11 | End: 2022-03-11 | Stop reason: HOSPADM

## 2022-03-11 RX ORDER — EPHEDRINE SULFATE 50 MG/ML
INJECTION, SOLUTION INTRAVENOUS AS NEEDED
Status: DISCONTINUED | OUTPATIENT
Start: 2022-03-11 | End: 2022-03-11 | Stop reason: SURG

## 2022-03-11 RX ORDER — SODIUM CHLORIDE 0.9 % (FLUSH) 0.9 %
3-10 SYRINGE (ML) INJECTION AS NEEDED
Status: DISCONTINUED | OUTPATIENT
Start: 2022-03-11 | End: 2022-03-14 | Stop reason: HOSPADM

## 2022-03-11 RX ORDER — SODIUM CHLORIDE 0.9 % (FLUSH) 0.9 %
10 SYRINGE (ML) INJECTION AS NEEDED
Status: DISCONTINUED | OUTPATIENT
Start: 2022-03-11 | End: 2022-03-14 | Stop reason: HOSPADM

## 2022-03-11 RX ORDER — ACETAMINOPHEN 650 MG/1
650 SUPPOSITORY RECTAL EVERY 4 HOURS PRN
Status: DISCONTINUED | OUTPATIENT
Start: 2022-03-11 | End: 2022-03-14 | Stop reason: HOSPADM

## 2022-03-11 RX ORDER — NEOSTIGMINE METHYLSULFATE 0.5 MG/ML
INJECTION, SOLUTION INTRAVENOUS AS NEEDED
Status: DISCONTINUED | OUTPATIENT
Start: 2022-03-11 | End: 2022-03-11 | Stop reason: SURG

## 2022-03-11 RX ORDER — SODIUM CHLORIDE, SODIUM LACTATE, POTASSIUM CHLORIDE, CALCIUM CHLORIDE 600; 310; 30; 20 MG/100ML; MG/100ML; MG/100ML; MG/100ML
9 INJECTION, SOLUTION INTRAVENOUS CONTINUOUS
Status: DISCONTINUED | OUTPATIENT
Start: 2022-03-11 | End: 2022-03-14 | Stop reason: HOSPADM

## 2022-03-11 RX ORDER — ONDANSETRON 2 MG/ML
4 INJECTION INTRAMUSCULAR; INTRAVENOUS ONCE AS NEEDED
Status: DISCONTINUED | OUTPATIENT
Start: 2022-03-11 | End: 2022-03-11 | Stop reason: HOSPADM

## 2022-03-11 RX ORDER — DEXAMETHASONE SODIUM PHOSPHATE 10 MG/ML
INJECTION INTRAMUSCULAR; INTRAVENOUS AS NEEDED
Status: DISCONTINUED | OUTPATIENT
Start: 2022-03-11 | End: 2022-03-11 | Stop reason: SURG

## 2022-03-11 RX ORDER — FENTANYL CITRATE 50 UG/ML
50 INJECTION, SOLUTION INTRAMUSCULAR; INTRAVENOUS
Status: DISCONTINUED | OUTPATIENT
Start: 2022-03-11 | End: 2022-03-11 | Stop reason: HOSPADM

## 2022-03-11 RX ADMIN — ROCURONIUM BROMIDE 20 MG: 50 INJECTION INTRAVENOUS at 15:31

## 2022-03-11 RX ADMIN — AMIODARONE HYDROCHLORIDE 200 MG: 200 TABLET ORAL at 21:30

## 2022-03-11 RX ADMIN — EPHEDRINE SULFATE 10 MG: 50 INJECTION INTRAVENOUS at 13:54

## 2022-03-11 RX ADMIN — OXYCODONE AND ACETAMINOPHEN 1 TABLET: 5; 325 TABLET ORAL at 18:27

## 2022-03-11 RX ADMIN — SODIUM CHLORIDE 75 ML/HR: 9 INJECTION, SOLUTION INTRAVENOUS at 10:58

## 2022-03-11 RX ADMIN — PHENYLEPHRINE HYDROCHLORIDE 100 MCG: 10 INJECTION, SOLUTION INTRAVENOUS at 12:21

## 2022-03-11 RX ADMIN — PROPOFOL 120 MG: 10 INJECTION, EMULSION INTRAVENOUS at 12:01

## 2022-03-11 RX ADMIN — MIDAZOLAM 0.5 MG: 1 INJECTION INTRAMUSCULAR; INTRAVENOUS at 11:29

## 2022-03-11 RX ADMIN — ROCURONIUM BROMIDE 50 MG: 50 INJECTION INTRAVENOUS at 12:01

## 2022-03-11 RX ADMIN — FAMOTIDINE 20 MG: 10 INJECTION INTRAVENOUS at 11:29

## 2022-03-11 RX ADMIN — FENTANYL CITRATE 50 MCG: 50 INJECTION INTRAMUSCULAR; INTRAVENOUS at 16:34

## 2022-03-11 RX ADMIN — ROCURONIUM BROMIDE 30 MG: 50 INJECTION INTRAVENOUS at 13:34

## 2022-03-11 RX ADMIN — GLYCOPYRROLATE 0.8 MG: 0.2 INJECTION INTRAMUSCULAR; INTRAVENOUS at 15:52

## 2022-03-11 RX ADMIN — BRIMONIDINE TARTRATE 1 DROP: 2 SOLUTION/ DROPS OPHTHALMIC at 21:30

## 2022-03-11 RX ADMIN — Medication 3 ML: at 21:31

## 2022-03-11 RX ADMIN — PROPOFOL 50 MG: 10 INJECTION, EMULSION INTRAVENOUS at 12:16

## 2022-03-11 RX ADMIN — NEOSTIGMINE METHYLSULFATE 5 MG: 0.5 INJECTION INTRAVENOUS at 15:52

## 2022-03-11 RX ADMIN — DEXAMETHASONE SODIUM PHOSPHATE 8 MG: 10 INJECTION INTRAMUSCULAR; INTRAVENOUS at 12:10

## 2022-03-11 RX ADMIN — FENTANYL CITRATE 50 MCG: 50 INJECTION INTRAMUSCULAR; INTRAVENOUS at 16:46

## 2022-03-11 RX ADMIN — ROCURONIUM BROMIDE 20 MG: 50 INJECTION INTRAVENOUS at 14:19

## 2022-03-11 RX ADMIN — SUGAMMADEX 400 MG: 100 INJECTION, SOLUTION INTRAVENOUS at 16:05

## 2022-03-11 NOTE — H&P
Patient Care Team:  Ryan Ramírez MD as PCP - General  GuillermoAkbar MD as Consulting Physician (Cardiology)  Deam, Andrew Proctor MD as Consulting Physician (Cardiac Electrophysiology)    Chief complaint Presents for AF ablation    Subjective     History of Present Illness   Mr Gardner is an 83 yo who presents for followup of his AFib.  His past medical history is significant for HTN, HLD, and CAD with a CABG in 2002.  He also has a history of paroxysmal atrial fibrillation and questionable pulmonary HTN.     He has been having symptoms of fatigue, dyspnea and lightheadedness for months.  He has also noted some mild chest discomfort. He denies any palpitations.  His most recent cardiac evaluation has included an echo 10/2021 which showed mild LV enlargement with a normal EF, normal RV size and function, mild MR with severe LAE, AoV sclerosis with mild AR, moderate TR with an estimated RVSP of 30mmHg.  A nuclear stress test 10/2021 showed a fixed inferior defect with no ischemia.     He had a cath 11/21 which showed 3 vessel CAD but with patent LIMA-LAD, SVG-ramus, SVG-OM and SVG-RCA.  Pulmonary pressures were normal.  A loop recorder was implanted which showed a high burden of AF, often with RVR.  He was placed on amiodarone.     He was cardioverted about a month ago and afterwards he felt much better with improved dyspnea and fatigue up until a few days ago.       Review of Systems   Constitutional: Positive for fatigue.   Respiratory: Positive for shortness of breath.    Cardiovascular: Positive for palpitations. Negative for chest pain and leg swelling.   All other systems reviewed and are negative.             Objective      Vital Signs       Physical Exam  Constitutional:       Appearance: Normal appearance.   HENT:      Head: Normocephalic and atraumatic.      Mouth/Throat:      Mouth: Mucous membranes are moist.   Eyes:      Extraocular Movements: Extraocular movements intact.       Pupils: Pupils are equal, round, and reactive to light.   Cardiovascular:      Rate and Rhythm: Rhythm irregular.   Pulmonary:      Breath sounds: Normal breath sounds.   Abdominal:      Palpations: Abdomen is soft.   Musculoskeletal:         General: Normal range of motion.      Cervical back: Neck supple.   Skin:     General: Skin is warm and dry.   Neurological:      General: No focal deficit present.      Mental Status: He is alert and oriented to person, place, and time.   Psychiatric:         Mood and Affect: Mood normal.         Results Review:          Assessment/Plan       Atrial fibrillation (CMS/[facility]) [I48.91]      Assessment & Plan  1. CAD with CABG in 2002 -  cath showed patent grafts, no angina, continue ASA and statin     2. Persistent AF - [id number] of at least 3(age, HTN, vascular disease), on coumadin, no symptoms of palpitation.  He had siignificant symptomatic improvement while in sinus rhythm.  Discussed indications, risks and benefits of AF ablation and he would like to proceed.     3. HTN - controlled, continue lisinopril/HCTZ     4. HLD - statin     RTC post ablation         Andrew Galvan MD  03/11/22  05:32 EST    Time:

## 2022-03-11 NOTE — ANESTHESIA PROCEDURE NOTES
Airway  Urgency: elective    Date/Time: 3/11/2022 12:05 PM  Airway not difficult    General Information and Staff    Patient location during procedure: OR  Anesthesiologist: Deanna Hernández MD  CRNA: Adrianne Ortega CRNA    Indications and Patient Condition  Indications for airway management: airway protection    Preoxygenated: yes  MILS maintained throughout  Mask difficulty assessment: 2 - vent by mask + OA or adjuvant +/- NMBA    Final Airway Details  Final airway type: endotracheal airway      Successful airway: ETT  Cuffed: yes   Successful intubation technique: direct laryngoscopy  Facilitating devices/methods: anterior pressure/BURP  Endotracheal tube insertion site: oral  Blade: Pastor  Blade size: 2  ETT size (mm): 7.5  Cormack-Lehane Classification: grade I - full view of glottis  Placement verified by: chest auscultation and capnometry   Cuff volume (mL): 8  Measured from: lips  ETT/EBT  to lips (cm): 23  Number of attempts at approach: 1  Assessment: lips, teeth, and gum same as pre-op and atraumatic intubation    Additional Comments  Pt preoxygenated, SIVI, bag mask vent, ATETI

## 2022-03-11 NOTE — DISCHARGE INSTRUCTIONS
[facility]  Cardiology  [address] [place] [address]  [phone]    Coronary Ablation After Care    Refer to this sheet in the next few weeks. These instructions provide you with information on caring for yourself after your procedure. Your health care provider may also give you more specific instructions. Your treatment has been planned according to current medical practices, but problems sometimes occur. Call your health care provider if you have any problems or questions after your procedure.      What to Expect After the Procedure:  After your procedure, it is typical to have the following sensations:  Minor discomfort or tenderness and a small bump at the catheter insertion site(s). The bump(s) should usually decrease in size and tenderness within 1 to 2 weeks.  Any bruising will usually fade within 2 to 4 weeks.  Home Care Instructions:  Do not apply powder or lotion to the site.  Do not take baths, swim, or use a hot tub until your health care provider approves and the site is completely healed.  Do not bend, squat, or lift anything over 20 lb (9 kg) or as directed by your health care provider. However, we recommend lifting nothing heavier than a gallon of milk.    You may shower 24 hours after the procedure. Remove the bandage (dressing) and gently wash the site with plain soap and water. Gently pat the site dry. You may apply a band aid daily for 2 days if desired.    Inspect the site at least twice daily.  Increase your fluid intake for the next 2 days.    Limit your activity for the first 48 hours.   Avoid strenuous activity for 1 week or as advised by your physician.    Follow instructions about when you can drive or return to work as directed by your physician.    Hold direct pressure over the site when you cough, sneeze, laugh or change positions.  Do this for the next 2 days.    Call Your Doctor If:  You have drainage (other than a small amount of blood on the dressing).  You  have chills or a fever > 101.  You have redness, warmth, swelling (larger than a walnut), or pain at the insertion   You develop palpitations, chest pain or shortness of breath, feel faint, or pass out.  You develop pain, discoloration, coldness, numbness, tingling, or severe bruising in the leg that held the catheter.  You develop bleeding from any other place, such as the bowels.  You have heavy bleeding from the site.  If this happens, hold pressure on the site and call 911.        Make Sure You:  Understand these instructions.  Will watch your condition.  Will get help right away if you are not doing well or get worse.

## 2022-03-11 NOTE — ANESTHESIA PREPROCEDURE EVALUATION
" Anesthesia Evaluation     Patient summary reviewed and Nursing notes reviewed   no history of anesthetic complications:  NPO Solid Status: > 8 hours  NPO Liquid Status: > 2 hours           Airway   Mallampati: II  Dental      Pulmonary - negative pulmonary ROS   Cardiovascular   Exercise tolerance: good (4-7 METS)    ECG reviewed  PT is on anticoagulation therapy    (+) hypertension, CAD, CABG, dysrhythmias, CHF , hyperlipidemia,       Neuro/Psych- negative ROS    ROS Comment: [place]  GI/Hepatic/Renal/Endo    (+)   renal disease CRI,     Musculoskeletal (-) negative ROS    Abdominal    Substance History - negative use     OB/GYN negative ob/gyn ROS         Other                        Anesthesia Plan    ASA 3     general   (/77 (BP Location: Left arm, Patient Position: Lying)   Pulse 60   Temp 36.4 °C (97.5 °F) (Temporal)   Resp 20   Ht 170.2 cm (67\")   Wt 90.3 kg (199 lb)   SpO2 98%   BMI 31.17 kg/m²     I have reviewed the patient's history with the patient and the chart, including all pertinent laboratory results and imaging. I have explained the risks of anesthesia including but not limited to dental damage, corneal abrasion, nerve injury, MI, stroke, and death.    )  intravenous induction     Anesthetic plan, all risks, benefits, and alternatives have been provided, discussed and informed consent has been obtained with: patient.        CODE STATUS:       "

## 2022-03-12 LAB
ANION GAP SERPL CALCULATED.3IONS-SCNC: 12 MMOL/L (ref 5–15)
BUN SERPL-MCNC: 36 MG/DL (ref 8–23)
BUN/CREAT SERPL: 15.5 (ref 7–25)
CALCIUM SPEC-SCNC: 8.8 MG/DL (ref 8.6–10.5)
CHLORIDE SERPL-SCNC: 103 MMOL/L (ref 98–107)
CO2 SERPL-SCNC: 24 MMOL/L (ref 22–29)
CREAT SERPL-MCNC: 2.33 MG/DL (ref 0.76–1.27)
DEPRECATED RDW RBC AUTO: 47.7 FL (ref 37–54)
EGFRCR SERPLBLD CKD-EPI 2021: 27.2 ML/MIN/1.73
ERYTHROCYTE [DISTWIDTH] IN BLOOD BY AUTOMATED COUNT: 15.6 % (ref 12.3–15.4)
GLUCOSE BLDC GLUCOMTR-MCNC: 114 MG/DL (ref 70–130)
GLUCOSE BLDC GLUCOMTR-MCNC: 117 MG/DL (ref 70–130)
GLUCOSE BLDC GLUCOMTR-MCNC: 134 MG/DL (ref 70–130)
GLUCOSE BLDC GLUCOMTR-MCNC: 138 MG/DL (ref 70–130)
GLUCOSE SERPL-MCNC: 138 MG/DL (ref 65–99)
HCT VFR BLD AUTO: 33.7 % (ref 37.5–51)
HGB BLD-MCNC: 10.9 G/DL (ref 13–17.7)
INR PPP: 3.09 (ref 0.9–1.1)
MCH RBC QN AUTO: 27.6 PG (ref 26.6–33)
MCHC RBC AUTO-ENTMCNC: 32.3 G/DL (ref 31.5–35.7)
MCV RBC AUTO: 85.3 FL (ref 79–97)
PLATELET # BLD AUTO: 197 10*3/MM3 (ref 140–450)
PMV BLD AUTO: 11.8 FL (ref 6–12)
POTASSIUM SERPL-SCNC: 5 MMOL/L (ref 3.5–5.2)
PROTHROMBIN TIME: 32 SECONDS (ref 11.7–14.2)
RBC # BLD AUTO: 3.95 10*6/MM3 (ref 4.14–5.8)
SODIUM SERPL-SCNC: 139 MMOL/L (ref 136–145)
WBC NRBC COR # BLD: 10.59 10*3/MM3 (ref 3.4–10.8)

## 2022-03-12 PROCEDURE — 85027 COMPLETE CBC AUTOMATED: CPT | Performed by: INTERNAL MEDICINE

## 2022-03-12 PROCEDURE — 80048 BASIC METABOLIC PNL TOTAL CA: CPT | Performed by: INTERNAL MEDICINE

## 2022-03-12 PROCEDURE — 99232 SBSQ HOSP IP/OBS MODERATE 35: CPT | Performed by: INTERNAL MEDICINE

## 2022-03-12 PROCEDURE — 85610 PROTHROMBIN TIME: CPT | Performed by: INTERNAL MEDICINE

## 2022-03-12 PROCEDURE — 93005 ELECTROCARDIOGRAM TRACING: CPT | Performed by: INTERNAL MEDICINE

## 2022-03-12 PROCEDURE — 82962 GLUCOSE BLOOD TEST: CPT

## 2022-03-12 PROCEDURE — G0378 HOSPITAL OBSERVATION PER HR: HCPCS

## 2022-03-12 RX ORDER — PANTOPRAZOLE SODIUM 40 MG/1
40 TABLET, DELAYED RELEASE ORAL
Qty: 60 TABLET | Refills: 0 | Status: SHIPPED | OUTPATIENT
Start: 2022-03-12 | End: 2022-03-14 | Stop reason: SDUPTHER

## 2022-03-12 RX ADMIN — AMIODARONE HYDROCHLORIDE 200 MG: 200 TABLET ORAL at 09:28

## 2022-03-12 RX ADMIN — Medication 3 ML: at 21:00

## 2022-03-12 RX ADMIN — BRIMONIDINE TARTRATE 1 DROP: 2 SOLUTION/ DROPS OPHTHALMIC at 21:00

## 2022-03-12 RX ADMIN — BRIMONIDINE TARTRATE 1 DROP: 2 SOLUTION/ DROPS OPHTHALMIC at 09:30

## 2022-03-12 RX ADMIN — LATANOPROST 1 DROP: 50 SOLUTION OPHTHALMIC at 20:58

## 2022-03-12 RX ADMIN — OXYCODONE AND ACETAMINOPHEN 1 TABLET: 5; 325 TABLET ORAL at 18:48

## 2022-03-12 RX ADMIN — PANTOPRAZOLE SODIUM 40 MG: 40 TABLET, DELAYED RELEASE ORAL at 09:28

## 2022-03-12 NOTE — PROGRESS NOTES
[facility] Clinical Pharmacy Services: Warfarin Dosing/Monitoring Consult    Jamin Gardner is a 82 y.o. male, estimated creatinine clearance is 26.1 mL/min (A) (by C-G formula based on SCr of 2.33 mg/dL (H)). weighing 89.5 kg (197 lb 6.4 oz).    Results from last 7 days   Lab Units 03/12/22  0419 03/11/22  1848 03/11/22  1104 03/11/22  1100 03/09/22  1551   INR  3.09* 3.04* 2.84* 2.9* 3.15*   HEMOGLOBIN g/dL 10.9*  --   --   --  12.9*   HEMATOCRIT % 33.7*  --   --   --  40.5   PLATELETS 10*3/mm3 197  --   --   --  228     Prior to admission anticoagulation:   Per med rec, patient takes 4 mg PO daily.     Hospital Anticoagulation:  Consulting provider: Andrew Galvan MD  Indication: Atrial Fibrillation - requiring full anticoagulation  Target INR: 2 - 3  Expected duration: indefinite   Bridge Therapy: No      Potential food or drug interactions:   - Amiodarone: May enhance the anticoagulant effect of warfarin. (Home med)   -Acetaminophen: may result in an increased risk of bleeding.   Pantoprazole: may result in increased International Normalized Ratio (INR) and prothrombin time      Education complete?/Date: No; plan for follow up TBD    Assessment/Plan:  Hold warfarin for today. INR remains slightly elevated   Nursing to monitor for any signs or symptoms of bleeding  Follow up daily INRs and dose adjustments    Pharmacy will continue to follow until discharge or discontinuation of warfarin.     Randi Mast [facility]  Clinical Pharmacist

## 2022-03-12 NOTE — PROGRESS NOTES
[facility] Clinical Pharmacy Services: Warfarin Dosing/Monitoring Consult    Jamin Gardner is a 82 y.o. male, estimated creatinine clearance is 40.4 mL/min (A) (by C-G formula based on SCr of 1.51 mg/dL (H)). weighing 90.3 kg (199 lb).    Results from last 7 days   Lab Units 03/11/22  1848 03/11/22  1104 03/11/22  1100 03/09/22  1551   INR  3.04* 2.84* 2.9* 3.15*   HEMOGLOBIN g/dL  --   --   --  12.9*   HEMATOCRIT %  --   --   --  40.5   PLATELETS 10*3/mm3  --   --   --  228     Prior to admission anticoagulation:   Per med rec, patient takes 4 mg PO daily.     Hospital Anticoagulation:  Consulting provider: Andrew Galvan MD  Indication: Atrial Fibrillation - requiring full anticoagulation  Target INR: 2 - 3  Expected duration: indefinite   Bridge Therapy: No      Potential food or drug interactions:   - Amiodarone: May enhance the anticoagulant effect of warfarin. (Home med)   - Simvastatin: May enhance the anticoagulant effect of warfarin. (Home med, not reordered on admission)    Education complete?/Date: No; plan for follow up TBD    Assessment/Plan:  Hold warfarin for tonight due to INR trending upwards, now slightly supratherapeutic.   Monitor for any signs or symptoms of bleeding  Follow up daily INRs and dose adjustments    Pharmacy will continue to follow until discharge or discontinuation of warfarin.     Luis Morales, PharmD  Clinical Pharmacist

## 2022-03-12 NOTE — PROGRESS NOTES
[facility]ART SPECIAL[facility]      Hospital Follow Up    LOS:  LOS: 0 days   Patient Name: Jamin Gardner  Age/Sex: 82 y.o. male  : 1940  MRN: [id number]    Day of Service: 22   Length of Stay: 0  Encounter Provider: Andrew Galvan MD  Place of Service: [facility]  Patient Care Team:  Ryan Ramírez MD as PCP - Akbar Cardozo MD as Consulting Physician (Cardiology)  Andrew Galvan MD as Consulting Physician (Cardiac Electrophysiology)    Subjective:     Interval History: No acute events overnight.  Patient sitting up in the chair, no complaints of pain or dyspnea.    Current Medications:   Scheduled Meds:amiodarone, 200 mg, Oral, Daily  brimonidine, 1 drop, Both Eyes, BID  latanoprost, 1 drop, Both Eyes, Nightly  pantoprazole, 40 mg, Oral, BID AC  sodium chloride, 3 mL, Intravenous, Q12H  sodium chloride, 3 mL, Intravenous, Q12H  warfarin (COUMADIN) (dosing per levels), , Does not apply, Daily      Continuous Infusions:lactated ringers, 9 mL/hr  Pharmacy to dose warfarin,         Allergies:  No Known Allergies    Review of Systems   Constitutional: Negative for malaise/fatigue.   Cardiovascular: Negative for chest pain, dyspnea on exertion, irregular heartbeat, leg swelling and palpitations.   Respiratory: Negative for shortness of breath.        Objective:     Temp:  [97.3 °F (36.3 °C)-98.4 °F (36.9 °C)] 97.3 °F (36.3 °C)  Heart Rate:  [55-81] 76  Resp:  [16-20] 16  BP: ()/() 96/65     Intake/Output Summary (Last 24 hours) at 3/12/2022 0833  Last data filed at 3/12/2022 0736  Gross per 24 hour   Intake 2123 ml   Output 1375 ml   Net 748 ml     Body mass index is 30.92 kg/m².      22  1054 22  0625   Weight: 90.3 kg (199 lb) 89.5 kg (197 lb 6.4 oz)           Constitutional:       Appearance: Not in distress.   Eyes:      Pupils: Pupils are equal, round, and reactive to light.   HENT:     Mouth/Throat:      Pharynx: Oropharynx is clear.   Neck:      Vascular: JVD normal.   Pulmonary:      Effort: Pulmonary effort is normal.      Breath sounds: Normal breath sounds.   Cardiovascular:      Normal rate. Regular rhythm.   Abdominal:      Palpations: Abdomen is soft.   Musculoskeletal: Normal range of motion. Skin:     General: Skin is warm and dry.   Neurological:      General: No focal deficit present.      Mental Status: Alert.           Lab Review:   Results from last 7 days   Lab Units 03/12/22  0419 03/09/22  1551   SODIUM mmol/L 139 139   POTASSIUM mmol/L 5.0 4.1   CHLORIDE mmol/L 103 102   CO2 mmol/L 24.0 23.4   BUN mg/dL 36* 29*   CREATININE mg/dL 2.33* 1.51*   GLUCOSE mg/dL 138* 91   CALCIUM mg/dL 8.8 9.6   AST (SGOT) U/L  --  16   ALT (SGPT) U/L  --  12         Results from last 7 days   Lab Units 03/12/22  0419 03/09/22  1551   WBC 10*3/mm3 10.59 6.69   HEMOGLOBIN g/dL 10.9* 12.9*   HEMATOCRIT % 33.7* 40.5   PLATELETS 10*3/mm3 197 228     Results from last 7 days   Lab Units 03/12/22  0419 03/11/22  1848   INR  3.09* 3.04*               Invalid input(s): LDLCALC            Recent Radiology:  Imaging Results (Most Recent)     None          I reviewed the patient's new clinical results.  I personally viewed and interpreted the patient's EKG      Assessment:       Atrial fibrillation (CMS/[facility]) [I48.91]    Persistent atrial fibrillation (HCC)    1. CAD with CABG in 2002 -  cath showed patent grafts, no angina, continue ASA and statin     2. Persistent AF - [id number] of at least 3(age, HTN, vascular disease), on coumadin, s/p ablation     3. HTN - controlled     4. HLD - statin    5. KATHY - creatinine up to 2.3 from 1.5 pre ablation.    Plan:     POD1 AF ablation, remains in NSR.  Continue amiodarone and coumadin    KATHY - creatinine up to 2.3, will hold lisinopril/HCTZ and repeat labs in am        Andrew Galvan MD  03/12/22  08:33 EST

## 2022-03-12 NOTE — NURSING NOTE
Pt urine output 150ml for last 10 hours, bladder scan x3 revealed 0ml residual in bladder. Pt has been drinking adequate fluid intake along with IV fluid maintenance. Will contact MD with findings and WCTM.

## 2022-03-12 NOTE — PLAN OF CARE
Goal Outcome Evaluation:  Plan of Care Reviewed With: patient        Progress: no change  Outcome Evaluation: VSS, groin sites CDI and brusied, no c/o pain, wctm

## 2022-03-13 LAB
ANION GAP SERPL CALCULATED.3IONS-SCNC: 13.3 MMOL/L (ref 5–15)
BUN SERPL-MCNC: 37 MG/DL (ref 8–23)
BUN/CREAT SERPL: 19 (ref 7–25)
CALCIUM SPEC-SCNC: 8.1 MG/DL (ref 8.6–10.5)
CHLORIDE SERPL-SCNC: 101 MMOL/L (ref 98–107)
CO2 SERPL-SCNC: 22.7 MMOL/L (ref 22–29)
CREAT SERPL-MCNC: 1.95 MG/DL (ref 0.76–1.27)
DEPRECATED RDW RBC AUTO: 48.6 FL (ref 37–54)
EGFRCR SERPLBLD CKD-EPI 2021: 33.7 ML/MIN/1.73
ERYTHROCYTE [DISTWIDTH] IN BLOOD BY AUTOMATED COUNT: 15.8 % (ref 12.3–15.4)
GLUCOSE BLDC GLUCOMTR-MCNC: 109 MG/DL (ref 70–130)
GLUCOSE BLDC GLUCOMTR-MCNC: 110 MG/DL (ref 70–130)
GLUCOSE BLDC GLUCOMTR-MCNC: 117 MG/DL (ref 70–130)
GLUCOSE BLDC GLUCOMTR-MCNC: 124 MG/DL (ref 70–130)
GLUCOSE SERPL-MCNC: 120 MG/DL (ref 65–99)
HCT VFR BLD AUTO: 30.7 % (ref 37.5–51)
HGB BLD-MCNC: 10 G/DL (ref 13–17.7)
INR PPP: 2.9 (ref 0.9–1.1)
MCH RBC QN AUTO: 27.9 PG (ref 26.6–33)
MCHC RBC AUTO-ENTMCNC: 32.6 G/DL (ref 31.5–35.7)
MCV RBC AUTO: 85.8 FL (ref 79–97)
PLATELET # BLD AUTO: 158 10*3/MM3 (ref 140–450)
PMV BLD AUTO: 11.5 FL (ref 6–12)
POTASSIUM SERPL-SCNC: 4.2 MMOL/L (ref 3.5–5.2)
PROTHROMBIN TIME: 30.5 SECONDS (ref 11.7–14.2)
QT INTERVAL: 364 MS
QT INTERVAL: 481 MS
RBC # BLD AUTO: 3.58 10*6/MM3 (ref 4.14–5.8)
SODIUM SERPL-SCNC: 137 MMOL/L (ref 136–145)
WBC NRBC COR # BLD: 11.18 10*3/MM3 (ref 3.4–10.8)

## 2022-03-13 PROCEDURE — 99232 SBSQ HOSP IP/OBS MODERATE 35: CPT | Performed by: INTERNAL MEDICINE

## 2022-03-13 PROCEDURE — 82962 GLUCOSE BLOOD TEST: CPT

## 2022-03-13 PROCEDURE — 85027 COMPLETE CBC AUTOMATED: CPT | Performed by: INTERNAL MEDICINE

## 2022-03-13 PROCEDURE — 85610 PROTHROMBIN TIME: CPT | Performed by: INTERNAL MEDICINE

## 2022-03-13 PROCEDURE — 80048 BASIC METABOLIC PNL TOTAL CA: CPT | Performed by: INTERNAL MEDICINE

## 2022-03-13 PROCEDURE — 93005 ELECTROCARDIOGRAM TRACING: CPT | Performed by: INTERNAL MEDICINE

## 2022-03-13 PROCEDURE — 93010 ELECTROCARDIOGRAM REPORT: CPT | Performed by: INTERNAL MEDICINE

## 2022-03-13 RX ORDER — NITROGLYCERIN 0.4 MG/1
0.4 TABLET SUBLINGUAL
Status: DISCONTINUED | OUTPATIENT
Start: 2022-03-13 | End: 2022-03-14 | Stop reason: HOSPADM

## 2022-03-13 RX ORDER — WARFARIN SODIUM 2 MG/1
2 TABLET ORAL
Status: COMPLETED | OUTPATIENT
Start: 2022-03-13 | End: 2022-03-13

## 2022-03-13 RX ORDER — ALUMINA, MAGNESIA, AND SIMETHICONE 2400; 2400; 240 MG/30ML; MG/30ML; MG/30ML
15 SUSPENSION ORAL ONCE
Status: COMPLETED | OUTPATIENT
Start: 2022-03-13 | End: 2022-03-13

## 2022-03-13 RX ADMIN — PANTOPRAZOLE SODIUM 40 MG: 40 TABLET, DELAYED RELEASE ORAL at 17:25

## 2022-03-13 RX ADMIN — Medication 3 ML: at 20:25

## 2022-03-13 RX ADMIN — OXYCODONE AND ACETAMINOPHEN 1 TABLET: 5; 325 TABLET ORAL at 03:45

## 2022-03-13 RX ADMIN — ACETAMINOPHEN 650 MG: 325 TABLET ORAL at 17:25

## 2022-03-13 RX ADMIN — ACETAMINOPHEN 650 MG: 325 TABLET ORAL at 09:32

## 2022-03-13 RX ADMIN — Medication 3 ML: at 20:26

## 2022-03-13 RX ADMIN — WARFARIN 2 MG: 2 TABLET ORAL at 17:27

## 2022-03-13 RX ADMIN — BRIMONIDINE TARTRATE 1 DROP: 2 SOLUTION/ DROPS OPHTHALMIC at 20:25

## 2022-03-13 RX ADMIN — BRIMONIDINE TARTRATE 1 DROP: 2 SOLUTION/ DROPS OPHTHALMIC at 09:34

## 2022-03-13 RX ADMIN — MAGNESIUM HYDROXIDE,ALUMINUM HYDROXICE,SIMETHICONE 15 ML: 240; 2400; 2400 SUSPENSION ORAL at 17:24

## 2022-03-13 RX ADMIN — PANTOPRAZOLE SODIUM 40 MG: 40 TABLET, DELAYED RELEASE ORAL at 06:23

## 2022-03-13 RX ADMIN — Medication 3 ML: at 09:34

## 2022-03-13 RX ADMIN — AMIODARONE HYDROCHLORIDE 200 MG: 200 TABLET ORAL at 09:32

## 2022-03-13 RX ADMIN — LATANOPROST 1 DROP: 50 SOLUTION OPHTHALMIC at 20:25

## 2022-03-13 NOTE — PLAN OF CARE
Goal Outcome Evaluation:  Plan of Care Reviewed With: patient         Patient is A&Ox4. All VS stable. Slept well overnight. Good urine output tonight.Complaints of pain treated per MAR. Bilateral groin sites clean/dry/intact and bruises. No distress noted. WCTM

## 2022-03-13 NOTE — PROGRESS NOTES
[facility] HEART SPECIALISTS        LOS:  LOS: 0 days   Patient Name: Jamin Gardner  Age/Sex: 82 y.o. male  : 1940  MRN: [id number]    Day of Service: 22   Length of Stay: 0  Encounter Provider: CHELSIE Flower  Place of Service: [facility] CARDIOLOGY  Patient Care Team:  Ryan Ramírez MD as PCP - General  Akbar Craft MD as Consulting Physician (Cardiology)  Deam, Andrew Proctor MD as Consulting Physician (Cardiac Electrophysiology)    Subjective:     Chief Complaint:  F/U Afib ablation    Subjective:   Patient is very [place] and therefore history is somewhat limited.  He cannot tell me where he gets his protimes checked.  He reports good urine output.  He denies dyspnea.  He indicates some epigastric discomfort radiating to the lower chest and worse with inspiration.      Current Medications:   Scheduled Meds:amiodarone, 200 mg, Oral, Daily  brimonidine, 1 drop, Both Eyes, BID  latanoprost, 1 drop, Both Eyes, Nightly  pantoprazole, 40 mg, Oral, BID AC  sodium chloride, 3 mL, Intravenous, Q12H  sodium chloride, 3 mL, Intravenous, Q12H  warfarin (COUMADIN) (dosing per levels), , Does not apply, Daily  warfarin, 2 mg, Oral, Once      Continuous Infusions:lactated ringers, 9 mL/hr  Pharmacy to dose warfarin,         Allergies:  No Known Allergies    Review of Systems   Constitutional: Positive for malaise/fatigue.   Cardiovascular: Negative for chest pain, dyspnea on exertion, leg swelling, palpitations and syncope.   Respiratory: Negative for cough and shortness of breath.    Musculoskeletal: Negative for arthritis and myalgias.   Gastrointestinal: Negative for diarrhea, heartburn, hematochezia, melena, nausea and vomiting.   Neurological: Negative for numbness and paresthesias.       Objective:     Temp:  [97.2 °F (36.2 °C)-98.5 °F (36.9 °C)] 98.5 °F (36.9 °C)  Heart Rate:  [61-77] 77  Resp:  [16-18] 18  BP: ()/(43-80)  136/80     Intake/Output Summary (Last 24 hours) at 3/13/2022 1019  Last data filed at 3/13/2022 0830  Gross per 24 hour   Intake 1690 ml   Output 1050 ml   Net 640 ml     Body mass index is 32.04 kg/m².      03/11/22  1054 03/12/22  0625 03/13/22  0400   Weight: 90.3 kg (199 lb) 89.5 kg (197 lb 6.4 oz) 92.8 kg (204 lb 9.6 oz)         Physical Exam:  Neuro:  CV:  Resp:  GI:  Ext:  Tele: AAOx3, [place]  S1S2 RRR, 2/6 systolic murmur  Non-labored, CTA  BS+, abd soft  Pedal pulses palp, no edema  SR                                                   Lab Review:   Results from last 7 days   Lab Units 03/13/22  0608 03/12/22  0419 03/09/22  1551   SODIUM mmol/L 137 139 139   POTASSIUM mmol/L 4.2 5.0 4.1   CHLORIDE mmol/L 101 103 102   CO2 mmol/L 22.7 24.0 23.4   BUN mg/dL 37* 36* 29*   CREATININE mg/dL 1.95* 2.33* 1.51*   GLUCOSE mg/dL 120* 138* 91   CALCIUM mg/dL 8.1* 8.8 9.6   AST (SGOT) U/L  --   --  16   ALT (SGPT) U/L  --   --  12         Results from last 7 days   Lab Units 03/13/22  0608 03/12/22  0419   WBC 10*3/mm3 11.18* 10.59   HEMOGLOBIN g/dL 10.0* 10.9*   HEMATOCRIT % 30.7* 33.7*   PLATELETS 10*3/mm3 158 197     Results from last 7 days   Lab Units 03/13/22  0608 03/12/22  0419   INR  2.90* 3.09*               Invalid input(s): LDLCALC            Recent Radiology:  Imaging Results (Most Recent)     None          ECHOCARDIOGRAM:    Results for orders placed during the hospital encounter of 03/11/22    Adult Transesophageal Echo (LISA) W/ Cont if Necessary Per Protocol    Interpretation Summary  · Left ventricular ejection fraction appears to be 56 - 60%. Left ventricular systolic function is normal.  · Left ventricular wall thickness is consistent with mild concentric hypertrophy.  · Left ventricular diastolic function is consistent with (grade I) impaired relaxation.  · The right ventricular cavity is mildly dilated. Normal right ventricular systolic function noted.  · The left atrial cavity is severely  dilated.  · No evidence of a left atrial appendage thrombus was present.  · Mild aortic valve regurgitation is present  · Mild mitral valve regurgitation is present.  · Mild to moderate tricuspid valve regurgitation is present.  · There are moderate to severe plaques in the descending thoracic aorta.  · There is no evidence of pericardial effusion        I reviewed the patient's new clinical results.    EKG:      Assessment:       Atrial fibrillation (CMS/HCC) [I48.91]    Persistent atrial fibrillation (HCC)    1) Persistent Afib s/p successful ablation 3/11   - continue on PO amiodarone  - not on beta blocker, presumably for bradycardia  - continue on bid PPI x 30 days  - on coumadin for anticoagulation; INR 3.09 --> 2.90 today  - appears to be on coumadin 4 mg PO daily at home  - LISA 3/11 showed EF = 56-60% with grade 1 diastolic dysfunction, mild AI/MR, mild to moderate TR    2) CAD s/p remote CABG  - cath 11/2021 showed patent grafts    3) KATHY  - Cr 1.5 --> 2.33 post ablation --> 1.95 today 3/13  - CKD? Cr 1.51 on admit    4) HTN    5) HLD    Plan:     Cr improved 2.33 --> 1.95 with holding ACEi/HCTZ and will continue to hold.  INR 3.09 --> 2.90 today with holding coumadin.  Will give coumadin 2 mg PO today.  Patient c/o post ablation epigastric discomfort radiating to chest.  He is on PPI.  Will give trial GI cocktail.  WBC mildly trending up post ablation.  He is afebrile.  Reactive leukocytosis?  Too early for AEF?  Will discuss with attending cardiologist for further recommendations.        Electronically signed by CHELSIE Flower, 03/13/22, 10:48 AM EDT.    Cardiology Staff:  I have seen and examined the patient.  I agree with CHELSIE Waller assessment and plan.  Sitting on the side of the bed.  He states that the sharp chest discomfort that he was experiencing has resolved.  He does not complain of shortness of air  Agree with CHELSIE Hernandez physical exam  Creatinine has improved.  INR trending  down  His daughter is in the room and states that he has not been ambulating.  We will see if staff can ambulate him in the daniels today    I have personally reviewed the lab, radiographic studies, monitor and pertinent EKGs  EKG was sinus rhythm.  Nonspecific T wave changes

## 2022-03-13 NOTE — PROGRESS NOTES
[facility] Clinical Pharmacy Services: Warfarin Dosing/Monitoring Consult    Jamin Gardner is a 82 y.o. male, estimated creatinine clearance is 31.7 mL/min (A) (by C-G formula based on SCr of 1.95 mg/dL (H)). weighing 92.8 kg (204 lb 9.6 oz).    Results from last 7 days   Lab Units 03/13/22  0608 03/12/22  0419 03/11/22  1848 03/11/22  1104 03/11/22  1100 03/09/22  1551   INR  2.90* 3.09* 3.04* 2.84* 2.9* 3.15*   HEMOGLOBIN g/dL 10.0* 10.9*  --   --   --  12.9*   HEMATOCRIT % 30.7* 33.7*  --   --   --  40.5   PLATELETS 10*3/mm3 158 197  --   --   --  228     Prior to admission anticoagulation:   Per med rec, patient takes 4 mg PO daily.     Hospital Anticoagulation:  Consulting provider: Andrew Galvan MD  Indication: Atrial Fibrillation - requiring full anticoagulation  Target INR: 2 - 3  Expected duration: indefinite   Bridge Therapy: No      Potential food or drug interactions:   - Amiodarone: May enhance the anticoagulant effect of warfarin. (Home med)   -Acetaminophen: may result in an increased risk of bleeding.   Pantoprazole: may result in increased International Normalized Ratio (INR) and prothrombin time      Education complete?/Date: No; plan for follow up TBD    Assessment/Plan:  INR on high end of desired range (2.90). will give small 2 mg warfarin dose today and likely resume scheduled dosing tomorrow.   Nursing to monitor for any signs or symptoms of bleeding  Follow up daily INRs and dose adjustments    Pharmacy will continue to follow until discharge or discontinuation of warfarin.     Randi Mast [facility]  Clinical Pharmacist

## 2022-03-14 ENCOUNTER — READMISSION MANAGEMENT (OUTPATIENT)
Dept: CALL CENTER | Facility: HOSPITAL | Age: 82
End: 2022-03-14

## 2022-03-14 VITALS
DIASTOLIC BLOOD PRESSURE: 56 MMHG | WEIGHT: 200.8 LBS | SYSTOLIC BLOOD PRESSURE: 117 MMHG | OXYGEN SATURATION: 98 % | HEIGHT: 67 IN | RESPIRATION RATE: 16 BRPM | TEMPERATURE: 99 F | BODY MASS INDEX: 31.52 KG/M2 | HEART RATE: 68 BPM

## 2022-03-14 LAB
ACT BLD: 172 SECONDS (ref 82–152)
ANION GAP SERPL CALCULATED.3IONS-SCNC: 9 MMOL/L (ref 5–15)
BACTERIA UR QL AUTO: ABNORMAL /HPF
BILIRUB UR QL STRIP: NEGATIVE
BUN SERPL-MCNC: 26 MG/DL (ref 8–23)
BUN/CREAT SERPL: 20.3 (ref 7–25)
CALCIUM SPEC-SCNC: 8.5 MG/DL (ref 8.6–10.5)
CHLORIDE SERPL-SCNC: 101 MMOL/L (ref 98–107)
CLARITY UR: ABNORMAL
CO2 SERPL-SCNC: 26 MMOL/L (ref 22–29)
COLOR UR: YELLOW
CREAT SERPL-MCNC: 1.28 MG/DL (ref 0.76–1.27)
DEPRECATED RDW RBC AUTO: 49.9 FL (ref 37–54)
EGFRCR SERPLBLD CKD-EPI 2021: 55.9 ML/MIN/1.73
ERYTHROCYTE [DISTWIDTH] IN BLOOD BY AUTOMATED COUNT: 15.8 % (ref 12.3–15.4)
GLUCOSE BLDC GLUCOMTR-MCNC: 122 MG/DL (ref 70–130)
GLUCOSE SERPL-MCNC: 97 MG/DL (ref 65–99)
GLUCOSE UR STRIP-MCNC: NEGATIVE MG/DL
HCT VFR BLD AUTO: 28.8 % (ref 37.5–51)
HGB BLD-MCNC: 9.4 G/DL (ref 13–17.7)
HGB UR QL STRIP.AUTO: ABNORMAL
HYALINE CASTS UR QL AUTO: ABNORMAL /LPF
INR PPP: 1.86 (ref 0.9–1.1)
KETONES UR QL STRIP: NEGATIVE
LEUKOCYTE ESTERASE UR QL STRIP.AUTO: ABNORMAL
MCH RBC QN AUTO: 28.2 PG (ref 26.6–33)
MCHC RBC AUTO-ENTMCNC: 32.6 G/DL (ref 31.5–35.7)
MCV RBC AUTO: 86.5 FL (ref 79–97)
NITRITE UR QL STRIP: POSITIVE
PH UR STRIP.AUTO: <=5 [PH] (ref 5–8)
PLATELET # BLD AUTO: 136 10*3/MM3 (ref 140–450)
PMV BLD AUTO: 11.7 FL (ref 6–12)
POTASSIUM SERPL-SCNC: 4.2 MMOL/L (ref 3.5–5.2)
PROT UR QL STRIP: ABNORMAL
PROTHROMBIN TIME: 21.4 SECONDS (ref 11.7–14.2)
QT INTERVAL: 437 MS
RBC # BLD AUTO: 3.33 10*6/MM3 (ref 4.14–5.8)
RBC # UR STRIP: ABNORMAL /HPF
REF LAB TEST METHOD: ABNORMAL
SODIUM SERPL-SCNC: 136 MMOL/L (ref 136–145)
SP GR UR STRIP: 1.02 (ref 1–1.03)
SQUAMOUS #/AREA URNS HPF: ABNORMAL /HPF
UROBILINOGEN UR QL STRIP: ABNORMAL
WBC # UR STRIP: ABNORMAL /HPF
WBC NRBC COR # BLD: 9.99 10*3/MM3 (ref 3.4–10.8)

## 2022-03-14 PROCEDURE — 82962 GLUCOSE BLOOD TEST: CPT

## 2022-03-14 PROCEDURE — 85027 COMPLETE CBC AUTOMATED: CPT | Performed by: NURSE PRACTITIONER

## 2022-03-14 PROCEDURE — 93005 ELECTROCARDIOGRAM TRACING: CPT | Performed by: INTERNAL MEDICINE

## 2022-03-14 PROCEDURE — 87186 SC STD MICRODIL/AGAR DIL: CPT | Performed by: NURSE PRACTITIONER

## 2022-03-14 PROCEDURE — 99239 HOSP IP/OBS DSCHRG MGMT >30: CPT | Performed by: INTERNAL MEDICINE

## 2022-03-14 PROCEDURE — 81001 URINALYSIS AUTO W/SCOPE: CPT | Performed by: INTERNAL MEDICINE

## 2022-03-14 PROCEDURE — 85610 PROTHROMBIN TIME: CPT | Performed by: INTERNAL MEDICINE

## 2022-03-14 PROCEDURE — 93010 ELECTROCARDIOGRAM REPORT: CPT | Performed by: INTERNAL MEDICINE

## 2022-03-14 PROCEDURE — 80048 BASIC METABOLIC PNL TOTAL CA: CPT | Performed by: NURSE PRACTITIONER

## 2022-03-14 PROCEDURE — 87088 URINE BACTERIA CULTURE: CPT | Performed by: NURSE PRACTITIONER

## 2022-03-14 PROCEDURE — 87086 URINE CULTURE/COLONY COUNT: CPT | Performed by: NURSE PRACTITIONER

## 2022-03-14 RX ORDER — CIPROFLOXACIN 250 MG/1
250 TABLET, FILM COATED ORAL 2 TIMES DAILY
Qty: 6 TABLET | Refills: 0 | Status: SHIPPED | OUTPATIENT
Start: 2022-03-14 | End: 2022-03-17

## 2022-03-14 RX ORDER — PANTOPRAZOLE SODIUM 40 MG/1
40 TABLET, DELAYED RELEASE ORAL
Qty: 60 TABLET | Refills: 0 | Status: SHIPPED | OUTPATIENT
Start: 2022-03-14 | End: 2022-08-22

## 2022-03-14 RX ORDER — WARFARIN SODIUM 4 MG/1
4 TABLET ORAL
Status: DISCONTINUED | OUTPATIENT
Start: 2022-03-14 | End: 2022-03-14

## 2022-03-14 RX ORDER — WARFARIN SODIUM 4 MG/1
4 TABLET ORAL
Status: DISCONTINUED | OUTPATIENT
Start: 2022-03-14 | End: 2022-03-14 | Stop reason: HOSPADM

## 2022-03-14 RX ADMIN — Medication 3 ML: at 08:08

## 2022-03-14 RX ADMIN — BRIMONIDINE TARTRATE 1 DROP: 2 SOLUTION/ DROPS OPHTHALMIC at 08:08

## 2022-03-14 RX ADMIN — AMIODARONE HYDROCHLORIDE 200 MG: 200 TABLET ORAL at 08:08

## 2022-03-14 NOTE — PROGRESS NOTES
UA showed UTI and I have ordered to be sent for C&S.  As d/w Dr. Galvan, patient will go home on PO cipro and f/u with PCP within one week.  Given the potential for interaction of abx with coumadin, will ask patient to get protime check in 3 days.      Electronically signed by CHELSIE Flower, 03/14/22, 11:36 AM EDT.

## 2022-03-14 NOTE — PLAN OF CARE
Goal Outcome Evaluation:  Plan of Care Reviewed With: patient       All vs stable. No complaints of pain. Good urine output overnight. No distress noted. Will continue to monitor.

## 2022-03-14 NOTE — DISCHARGE SUMMARY
[facility] HEART SPECIALISTS    DISCHARGE SUMMARY      Patient Name: Jamin Gardner  :1940  82 y.o.    Date of Admit: 3/11/2022  Date of Discharge:  3/14/2022    Discharge Diagnosis:  Problems Addressed this Visit        Cardiac and Vasculature    * (Principal) Atrial fibrillation (CMS/HCC) [I48.91]    Relevant Orders    EP/CRM Study (Completed)      Diagnoses       Codes Comments    Persistent atrial fibrillation (HCC)     ICD-10-CM: I48.19  ICD-9-CM: 427.31           Hospital Course: Patient was admitted for ablation of AF.  He had pulmonary vein isolation and ablation of the CTI.  His post procedure course was complicated by KATHY with a rise in his creatinine to 2.3 from 1.5.  He was hydrated and lisinopril discontinued with resolution.      Procedures Performed  Procedure(s):  Ablation atrial fibrillation  3D MAPPING INSYTE EP       Consults     No orders found from 2/10/2022 to 3/12/2022.          Pertinent Test Results:   Results from last 7 days   Lab Units 22  1551   SODIUM mmol/L 136   < > 139   POTASSIUM mmol/L 4.2   < > 4.1   CHLORIDE mmol/L 101   < > 102   CO2 mmol/L 26.0   < > 23.4   BUN mg/dL 26*   < > 29*   CREATININE mg/dL 1.28*   < > 1.51*   CALCIUM mg/dL 8.5*   < > 9.6   BILIRUBIN mg/dL  --   --  0.3   ALK PHOS U/L  --   --  44   ALT (SGPT) U/L  --   --  12   AST (SGOT) U/L  --   --  16   GLUCOSE mg/dL 97   < > 91    < > = values in this interval not displayed.         @LABRCNT(bnp)@  Results from last 7 days   Lab Units 22   WBC 10*3/mm3 9.99   HEMOGLOBIN g/dL 9.4*   HEMATOCRIT % 28.8*   PLATELETS 10*3/mm3 136*     Results from last 7 days   Lab Units 22  0608 22  041   INR  1.86* 2.90* 3.09*               Condition on Discharge: stable    Discharge Medications     Discharge Medications      New Medications      Instructions Start Date   pantoprazole 40 MG EC tablet  Commonly known as: PROTONIX   40 mg, Oral, 2  Times Daily Before Meals         Continue These Medications      Instructions Start Date   Acetaminophen 500 MG capsule   Oral, Every 4 Hours PRN      amiodarone 200 MG tablet  Commonly known as: PACERONE   200 mg, Oral, Daily, Take 3 tablets daily for 7 days then 1 tablet daily thereafter      brimonidine 0.2 % ophthalmic solution  Commonly known as: ALPHAGAN   Both Eyes, 2 Times Daily      Lumigan 0.01 % ophthalmic drops  Generic drug: bimatoprost   PLACE ONE DROP INTO LEFT EYE EVERY NIGHT      simvastatin 20 MG tablet  Commonly known as: ZOCOR   20 mg, Oral, Nightly      warfarin 4 MG tablet  Commonly known as: COUMADIN   4 mg, Oral, Daily         Stop These Medications    lisinopril-hydrochlorothiazide 20-25 MG per tablet  Commonly known as: PRINZIDE,ZESTORETIC            Discharge Diet:     Activity at Discharge:     Discharge disposition: home    Follow-up Appointments  Future Appointments   Date Time Provider Department Center   3/28/2022  2:00 PM Andrew Galvan MD [facility]   4/4/2022  1:45 PM Andrew Galvan MD [facility]         Test Results Pending at Discharge       Andrew Galvan MD, [facility]    03/14/22  09:23 EDT    Time: >30 minutes

## 2022-03-14 NOTE — CASE MANAGEMENT/SOCIAL WORK
"Discharge Planning Assessment  [facility]     Patient Name: Jamin Gardner  MRN: [id number]  Today's Date: 3/14/2022    Admit Date: 3/11/2022     Discharge Needs Assessment     Row Name 03/14/22 1206       Living Environment    People in Home alone    Current Living Arrangements home    Primary Care Provided by self    Provides Primary Care For no one    Family Caregiver if Needed child(juan c), adult    Family Caregiver Names Roopa Mcleod \"CINTHYA\"    Quality of Family Relationships helpful;involved;supportive    Able to Return to Prior Arrangements yes       Resource/Environmental Concerns    Resource/Environmental Concerns none    Transportation Concerns no car       Transition Planning    Patient/Family Anticipates Transition to home    Patient/Family Anticipated Services at Transition none    Transportation Anticipated family or friend will provide       Discharge Needs Assessment    Readmission Within the Last 30 Days no previous admission in last 30 days    Equipment Currently Used at Home other (see comments)  Pt reports he has a straight can but he does not use it.    Concerns to be Addressed no discharge needs identified;denies needs/concerns at this time    Anticipated Changes Related to Illness none    Equipment Needed After Discharge none    Provided Post Acute Provider List? Refused    Refused Provider List Comment Pt refused HH list.               Discharge Plan     Row Name 03/14/22 1208       Plan    Plan Home w/ daughter transporting.    Plan Comments CCP spoke with Pt at chairside.  CCP introduced self and role.  Pt [place].  Pt confirmed information on face sheet.  Pt stated he is IADL’s, retired and drives.  Pt reports he lives alone in a two story home with one entrance stair step.  Pt reports his daughter Cinthya Mcleod ([phone]) is his emergency contact and will transport him home at discharge.  Pt reports his PCP is Ryan Ramírez.  Pt confirmed pharmacy is [facility] in [place], [place].  [facility]  Pt denies issues with affording his medications.  Pt denies past home health, sub-acute rehab or DME.  Pt reports he has a straight cane he does not use.  Pt plans to return home at discharge.  Pt denies current discharge needs.  CCP will continue to follow…….Rita PALUMBO/Case Mgmt              Continued Care and Services - Admitted Since 3/11/2022    Coordination has not been started for this encounter.       Expected Discharge Date and Time     Expected Discharge Date Expected Discharge Time    Mar 14, 2022          Demographic Summary     Row Name 03/14/22 1205       General Information    Admission Type inpatient    Arrived From home    Required Notices Provided Important Message from Medicare    Referral Source admission list    Reason for Consult discharge planning    Preferred Language English               Functional Status     Row Name 03/14/22 1205       Functional Status    Usual Activity Tolerance moderate    Current Activity Tolerance moderate       Functional Status, IADL    Medications independent    Meal Preparation independent    Housekeeping independent    Laundry independent    Shopping independent    IADL Comments Pt reports he does NOT use an assistive device when ambulating.       Mental Status    General Appearance WDL appearance;X    General Appearance unshaven       Mental Status Summary    Recent Changes in Mental Status/Cognitive Functioning no changes               Psychosocial    No documentation.                Abuse/Neglect    No documentation.                Legal    No documentation.                Substance Abuse    No documentation.                Patient Forms     Row Name 03/14/22 102       Patient Forms    Important Message from Medicare ([facility]) Delivered    Delivered to Patient    Method of delivery In person                  Rita Underwood RN

## 2022-03-15 NOTE — OUTREACH NOTE
Prep Survey    Flowsheet Row Responses   [facility] facility patient discharged from? [place]   Is LACE score < 7 ? No   Emergency Room discharge w/ pulse ox? No   Eligibility Readm Mgmt   Discharge diagnosis Atrial fibrillation    Does the patient have one of the following disease processes/diagnoses(primary or secondary)? Other   Does the patient have Home health ordered? No   Is there a DME ordered? No   Prep survey completed? Yes          ROB DOE - Registered Nurse

## 2022-03-16 LAB — BACTERIA SPEC AEROBE CULT: ABNORMAL

## 2022-03-17 ENCOUNTER — READMISSION MANAGEMENT (OUTPATIENT)
Dept: CALL CENTER | Facility: HOSPITAL | Age: 82
End: 2022-03-17

## 2022-03-17 NOTE — OUTREACH NOTE
Medical Week 1 Survey    Flowsheet Row Responses   [facility] patient discharged from? [place]   Does the patient have one of the following disease processes/diagnoses(primary or secondary)? Other   Week 1 attempt successful? No   Unsuccessful attempts Attempt 1   Discharge diagnosis Atrial fibrillation    Wrap up additional comments UTR x1          RADHA RYAN - Registered Nurse

## 2022-03-23 ENCOUNTER — READMISSION MANAGEMENT (OUTPATIENT)
Dept: CALL CENTER | Facility: HOSPITAL | Age: 82
End: 2022-03-23

## 2022-03-23 NOTE — OUTREACH NOTE
Medical Week 1 Survey    Flowsheet Row Responses   [facility] patient discharged from? [place]   Does the patient have one of the following disease processes/diagnoses(primary or secondary)? Other   Week 1 attempt successful? No   Unsuccessful attempts Attempt 2  [All numbers attempted- no answer ]          ROSELIA DEVI - Registered Nurse

## 2022-03-28 ENCOUNTER — OFFICE VISIT (OUTPATIENT)
Dept: CARDIOLOGY | Facility: CLINIC | Age: 82
End: 2022-03-28

## 2022-03-28 VITALS
SYSTOLIC BLOOD PRESSURE: 140 MMHG | DIASTOLIC BLOOD PRESSURE: 76 MMHG | HEART RATE: 77 BPM | WEIGHT: 200 LBS | HEIGHT: 67 IN | BODY MASS INDEX: 31.39 KG/M2

## 2022-03-28 DIAGNOSIS — Z45.09 ENCOUNTER FOR LOOP RECORDER CHECK: ICD-10-CM

## 2022-03-28 DIAGNOSIS — Z95.1 STATUS POST CORONARY ARTERY BYPASS GRAFT: ICD-10-CM

## 2022-03-28 DIAGNOSIS — I48.19 PERSISTENT ATRIAL FIBRILLATION: Primary | ICD-10-CM

## 2022-03-28 DIAGNOSIS — I10 PRIMARY HYPERTENSION: ICD-10-CM

## 2022-03-28 DIAGNOSIS — I25.10 CORONARY ARTERY DISEASE INVOLVING NATIVE CORONARY ARTERY OF NATIVE HEART WITHOUT ANGINA PECTORIS: ICD-10-CM

## 2022-03-28 PROCEDURE — 93291 INTERROG DEV EVAL SCRMS IP: CPT | Performed by: INTERNAL MEDICINE

## 2022-03-28 PROCEDURE — 99213 OFFICE O/P EST LOW 20 MIN: CPT | Performed by: INTERNAL MEDICINE

## 2022-03-28 RX ORDER — AMIODARONE HYDROCHLORIDE 200 MG/1
1 TABLET ORAL DAILY
COMMUNITY
End: 2022-08-22

## 2022-03-29 NOTE — PROGRESS NOTES
Subjective:     Encounter Date:2022      Patient ID: Jamin Gardner is a 82 y.o. male.    Chief Complaint:  Chief Complaint   Patient presents with   • Coronary Artery Disease   • Atrial Fibrillation       HPI:  Mr Gardner is an 81 yo who presents for followup of his AFib.  His past medical history is significant for HTN, HLD, and CAD with a CABG in .  He also has a history of paroxysmal atrial fibrillation and questionable pulmonary HTN.     He has been having symptoms of fatigue, dyspnea and lightheadedness for months.  He has also noted some mild chest discomfort. He denies any palpitations.  His most recent cardiac evaluation has included an echo 10/2021 which showed mild LV enlargement with a normal EF, normal RV size and function, mild MR with severe LAE, AoV sclerosis with mild AR, moderate TR with an estimated RVSP of 30mmHg.  A nuclear stress test 10/2021 showed a fixed inferior defect with no ischemia.     He had a cath  which showed 3 vessel CAD but with patent LIMA-LAD, SVG-ramus, SVG-OM and SVG-RCA.  Pulmonary pressures were normal.  A loop recorder was implanted which showed a high burden of AF, often with RVR.  He was placed on amiodarone.     He was cardioverted in 2022 but could not maintain sinus rhythm.    He was taken to the EP lab 3/2022 where he had the followin. Successful pulmonary vein isolation with demonstration of entrance and exit block  2. Successful ablation of the cavotricuspid isthmus with demonstration of bidirectional conduction block.  3. Normal SA node function  4. Normal AV node conduction  5. Normal His Purkinje conduction    His pre-ablation LISA showed:  · Left ventricular ejection fraction appears to be 56 - 60%. Left ventricular systolic function is normal.  · Left ventricular wall thickness is consistent with mild concentric hypertrophy.  · Left ventricular diastolic function is consistent with (grade I) impaired relaxation.  · The right  ventricular cavity is mildly dilated. Normal right ventricular systolic function noted.  · The left atrial cavity is severely dilated.  · No evidence of a left atrial appendage thrombus was present.  · Mild aortic valve regurgitation is present  · Mild mitral valve regurgitation is present.  · Mild to moderate tricuspid valve regurgitation is present.  · There are moderate to severe plaques in the descending thoracic aorta.  · There is no evidence of pericardial effusion    Following his ablation, he had a bump in his creatinine and his lisinopril and HCTZ were discontinued. He was discharged on amiodarone and coumadin.  Since his ablation, he has done relatively well without symptoms of chest pain, palpitations or dyspnea.        The following portions of the patient's history were reviewed and updated as appropriate: current medications, past family history, past medical history, past social history, past surgical history and problem list.    Problem List:  Patient Active Problem List   Diagnosis   • Atrial fibrillation (CMS/HCC) [I48.91]   • Long term (current) use of anticoagulants [Z79.01]   • CAD (coronary artery disease)   • Status post coronary artery bypass graft   • Tobacco use   • PAF (paroxysmal atrial fibrillation) ([facility])   • Primary hypertension   • Pulmonary hypertension (HCC)   • Hyperlipidemia   • Encounter for loop recorder check   • Persistent atrial fibrillation (HCC)       Active Med List:    Current Outpatient Medications:   •  Acetaminophen 500 MG capsule, Take  by mouth Every 4 (Four) Hours As Needed., Disp: , Rfl:   •  amiodarone (PACERONE) 200 MG tablet, Take 1 tablet by mouth Daily., Disp: , Rfl:   •  brimonidine (ALPHAGAN) 0.2 % ophthalmic solution, Administer  to both eyes 2 (Two) Times a Day., Disp: , Rfl:   •  LUMIGAN 0.01 % ophthalmic drops, PLACE ONE DROP INTO LEFT EYE EVERY NIGHT, Disp: , Rfl:   •  pantoprazole (PROTONIX) 40 MG EC tablet, Take 1 tablet by mouth 2 (Two) Times a Day  Before Meals., Disp: 60 tablet, Rfl: 0  •  simvastatin (ZOCOR) 20 MG tablet, Take 1 tablet by mouth Every Night., Disp: 90 tablet, Rfl: 3  •  warfarin (COUMADIN) 4 MG tablet, Take 1 tablet by mouth Daily., Disp: 90 tablet, Rfl: 3     Past Medical History:  Past Medical History:   Diagnosis Date   • Arrhythmia    • CAD (coronary artery disease)    • CHF (congestive heart failure) ([facility])    • Chronic kidney disease     STAGE 3   • Hyperlipidemia    • PAF (paroxysmal atrial fibrillation) ([facility])    • Primary hypertension    • Pulmonary hypertension ([facility])        Past Surgical History:  Past Surgical History:   Procedure Laterality Date   • CARDIAC CATHETERIZATION     • CARDIAC CATHETERIZATION N/A 11/3/2021    Procedure: Right and Left Heart Cath;  Surgeon: Luis Pastor MD;  Location: [facility] CATH INVASIVE LOCATION;  Service: Cardiovascular;  Laterality: N/A;   • CARDIAC CATHETERIZATION N/A 11/3/2021    Procedure: Coronary angiography;  Surgeon: Luis Pastor MD;  Location: [facility] CATH INVASIVE LOCATION;  Service: Cardiovascular;  Laterality: N/A;   • CARDIAC CATHETERIZATION N/A 11/3/2021    Procedure: Left ventriculography;  Surgeon: Luis Pastor MD;  Location: [facility] CATH INVASIVE LOCATION;  Service: Cardiovascular;  Laterality: N/A;   • CARDIAC CATHETERIZATION  11/3/2021    Procedure: Saphenous Vein Graft;  Surgeon: Luis Pastor MD;  Location: [facility] CATH INVASIVE LOCATION;  Service: Cardiovascular;;   • CARDIAC CATHETERIZATION N/A 11/3/2021    Procedure: Native mammary injection;  Surgeon: Luis Pastor MD;  Location: [facility] CATH INVASIVE LOCATION;  Service: Cardiovascular;  Laterality: N/A;   • CARDIAC ELECTROPHYSIOLOGY PROCEDURE N/A 11/3/2021    Procedure: LOOP INSERTION DR ALEXANDRE WILL IMPLANT LOOP FIRST THEN CATH;  Surgeon: Luis Pastor MD;  Location: [facility] CATH INVASIVE LOCATION;  Service: Cardiovascular;  Laterality: N/A;   • CARDIAC  "ELECTROPHYSIOLOGY PROCEDURE N/A 3/11/2022    Procedure: Ablation atrial fibrillation;  Surgeon: Andrew Galvan MD;  Location: [facility] INVASIVE LOCATION;  Service: Cardiovascular;  Laterality: N/A;   • CORONARY ARTERY BYPASS GRAFT  2002   • EYE SURGERY Right    • REPLACEMENT TOTAL KNEE Right        Social History:  Social History     Socioeconomic History   • Marital status:    Tobacco Use   • Smoking status: Never Smoker   • Smokeless tobacco: Current User     Types: Chew   Vaping Use   • Vaping Use: Never used   Substance and Sexual Activity   • Alcohol use: Not Currently   • Drug use: Never   • Sexual activity: Defer       Allergies:  No Known Allergies    Immunizations:  Immunization History   Administered Date(s) Administered   • Flu Vaccine Split Quad 09/12/2019   • Pneumococcal Conjugate 13-Valent (PCV13) 10/18/2018          Objective:         Review of Systems   Constitutional: Negative for fatigue and fever.   Respiratory: Negative for shortness of breath.    Cardiovascular: Negative for chest pain, palpitations and leg swelling.   All other systems reviewed and are negative.       /76   Pulse 77   Ht 170.2 cm (67\")   Wt 90.7 kg (200 lb)   BMI 31.32 kg/m²     Constitutional:       General: Not in acute distress.     Appearance: Well-developed.   Eyes:      General: No scleral icterus.     Conjunctiva/sclera: Conjunctivae normal.      Pupils: Pupils are equal, round, and reactive to light.   HENT:      Head: Normocephalic and atraumatic.   Neck:      Thyroid: No thyromegaly.   Pulmonary:      Effort: Pulmonary effort is normal.      Breath sounds: Normal breath sounds.   Cardiovascular:      Normal rate. Regular rhythm.   Abdominal:      General: Bowel sounds are normal.      Palpations: Abdomen is soft.   Musculoskeletal: Normal range of motion.      Cervical back: Normal range of motion. Skin:     General: Skin is warm and dry.   Neurological:      Mental Status: Alert and " oriented to person, place, and time.         In-Office Procedure(s):  Procedures  No orders to display      Loop recorder eval interpreted by me    SJM 4500  Battery OK    R wave 0.5mv    Events - no AF    ASCVD RIsk Score::  The ASCVD Risk score (Medfordjosh MARTINEZ Jr., et al., 2013) failed to calculate for the following reasons:    The 2013 ASCVD risk score is only valid for ages 40 to 79    Recent Radiology:          Lab Review:       Recent labs reviewed and interpreted for clinical significance and application          Assessment:          Diagnosis Plan   1. Persistent atrial fibrillation (HCC)     2. Encounter for loop recorder check     3. Primary hypertension     4. Coronary artery disease involving native coronary artery of native heart without angina pectoris     5. Status post coronary artery bypass graft            Plan:      1. CAD with CABG in 2002 -  cath showed patent grafts, no angina, continue ASA and statin     2. Persistent AF - [id number] of at least 3(age, HTN, vascular disease), on coumadin and amiodarone. Now about 3 weeks post ablation.  No AF documented on loop recorder since last interrogation.  Continue same     3. HTN - controlled, off lisinopril due to a bump in creatinine post ablation.     4. HLD - statin     RTC 3 months      Level of Care:                 Andrew Galvan MD  03/29/22

## 2022-04-11 ENCOUNTER — ANTICOAGULATION VISIT (OUTPATIENT)
Dept: CARDIOLOGY | Facility: CLINIC | Age: 82
End: 2022-04-11

## 2022-04-11 DIAGNOSIS — Z79.01 LONG TERM (CURRENT) USE OF ANTICOAGULANTS: ICD-10-CM

## 2022-04-11 DIAGNOSIS — I48.0 PAROXYSMAL ATRIAL FIBRILLATION: Primary | ICD-10-CM

## 2022-04-26 ENCOUNTER — ANTICOAGULATION VISIT (OUTPATIENT)
Dept: CARDIOLOGY | Facility: CLINIC | Age: 82
End: 2022-04-26

## 2022-04-26 DIAGNOSIS — Z79.01 LONG TERM (CURRENT) USE OF ANTICOAGULANTS: ICD-10-CM

## 2022-04-26 DIAGNOSIS — I48.0 PAROXYSMAL ATRIAL FIBRILLATION: Primary | ICD-10-CM

## 2022-04-26 LAB — INR PPP: 2.4

## 2022-04-28 ENCOUNTER — TELEPHONE (OUTPATIENT)
Dept: CARDIOLOGY | Facility: CLINIC | Age: 82
End: 2022-04-28

## 2022-04-28 NOTE — TELEPHONE ENCOUNTER
Starting 04/22 patient began to feel constantly dizzy and is having trouble standing up. Patient has fallen a few times due to the dizziness. Patients Daughter, Roopa, would like to know if we can remote transmission from his loop recorder for the previous week. Patients daughter is afraid he may be in Afib.     Please adviseUri

## 2022-04-28 NOTE — TELEPHONE ENCOUNTER
Lvm for Roopa, patient has a [employer] phone that he can send transmissions over, we can't do that from here. Once the transmission is done, asked her to call and let me know, so can look for them and send to Linette.

## 2022-04-28 NOTE — TELEPHONE ENCOUNTER
Roopa called back to clarify that the phone sends in an automatic transmission every morning at 2:00 AM.     Please adviseUri

## 2022-04-28 NOTE — TELEPHONE ENCOUNTER
We have not received a transmission on this patient. The last reading was when he was in on 1-27-22, so there must be something wrong with the phone or it was never registered. Lvm leaving this information

## 2022-05-02 ENCOUNTER — OFFICE VISIT (OUTPATIENT)
Dept: CARDIOLOGY | Facility: CLINIC | Age: 82
End: 2022-05-02

## 2022-05-02 VITALS
HEIGHT: 67 IN | WEIGHT: 197 LBS | BODY MASS INDEX: 30.92 KG/M2 | DIASTOLIC BLOOD PRESSURE: 60 MMHG | HEART RATE: 76 BPM | SYSTOLIC BLOOD PRESSURE: 156 MMHG

## 2022-05-02 DIAGNOSIS — I25.10 CORONARY ARTERY DISEASE INVOLVING NATIVE CORONARY ARTERY OF NATIVE HEART WITHOUT ANGINA PECTORIS: ICD-10-CM

## 2022-05-02 DIAGNOSIS — Z95.1 STATUS POST CORONARY ARTERY BYPASS GRAFT: ICD-10-CM

## 2022-05-02 DIAGNOSIS — Z45.09 ENCOUNTER FOR LOOP RECORDER CHECK: ICD-10-CM

## 2022-05-02 DIAGNOSIS — I10 PRIMARY HYPERTENSION: ICD-10-CM

## 2022-05-02 DIAGNOSIS — I48.0 PAROXYSMAL ATRIAL FIBRILLATION: Primary | ICD-10-CM

## 2022-05-02 PROCEDURE — 93291 INTERROG DEV EVAL SCRMS IP: CPT | Performed by: INTERNAL MEDICINE

## 2022-05-02 PROCEDURE — 99213 OFFICE O/P EST LOW 20 MIN: CPT | Performed by: INTERNAL MEDICINE

## 2022-05-03 NOTE — PROGRESS NOTES
Subjective:     Encounter Date:2022      Patient ID: Jamin Gardner is a 82 y.o. male.    Chief Complaint:  Chief Complaint   Patient presents with   • Follow-up       HPI:  Mr Gardner is an 81 yo who presents for followup of his AFib.  His past medical history is significant for HTN, HLD, and CAD with a CABG in .  He also has a history of paroxysmal atrial fibrillation and questionable pulmonary HTN.     He has been having symptoms of fatigue, dyspnea and lightheadedness for months.  He has also noted some mild chest discomfort. He denies any palpitations.  His most recent cardiac evaluation has included an echo 10/2021 which showed mild LV enlargement with a normal EF, normal RV size and function, mild MR with severe LAE, AoV sclerosis with mild AR, moderate TR with an estimated RVSP of 30mmHg.  A nuclear stress test 10/2021 showed a fixed inferior defect with no ischemia.     He had a cath  which showed 3 vessel CAD but with patent LIMA-LAD, SVG-ramus, SVG-OM and SVG-RCA.  Pulmonary pressures were normal.  A loop recorder was implanted which showed a high burden of AF, often with RVR.  He was placed on amiodarone.     He was cardioverted in 2022 but could not maintain sinus rhythm.     He was taken to the EP lab 3/2022 where he had the followin. Successful pulmonary vein isolation with demonstration of entrance and exit block  2. Successful ablation of the cavotricuspid isthmus with demonstration of bidirectional conduction block.  3. Normal SA node function  4. Normal AV node conduction  5. Normal His Purkinje conduction     His pre-ablation LISA showed:  · Left ventricular ejection fraction appears to be 56 - 60%. Left ventricular systolic function is normal.  · Left ventricular wall thickness is consistent with mild concentric hypertrophy.  · Left ventricular diastolic function is consistent with (grade I) impaired relaxation.  · The right ventricular cavity is mildly dilated. Normal  right ventricular systolic function noted.  · The left atrial cavity is severely dilated.  · No evidence of a left atrial appendage thrombus was present.  · Mild aortic valve regurgitation is present  · Mild mitral valve regurgitation is present.  · Mild to moderate tricuspid valve regurgitation is present.  · There are moderate to severe plaques in the descending thoracic aorta.  · There is no evidence of pericardial effusion     Following his ablation, he had a bump in his creatinine and his lisinopril and HCTZ were discontinued. He was discharged on amiodarone and coumadin.      Since he was last seen, he has developed an unsteady gait and has fallen.  He is also having some lightheadedness but no chest pain or palpitations.      The following portions of the patient's history were reviewed and updated as appropriate: current medications, past family history, past medical history, past social history, past surgical history and problem list.    Problem List:  Patient Active Problem List   Diagnosis   • Atrial fibrillation (CMS/HCC) [I48.91]   • Long term (current) use of anticoagulants [Z79.01]   • CAD (coronary artery disease)   • Status post coronary artery bypass graft   • Tobacco use   • PAF (paroxysmal atrial fibrillation) ([facility])   • Primary hypertension   • Pulmonary hypertension ([facility])   • Hyperlipidemia   • Encounter for loop recorder check   • Persistent atrial fibrillation ([facility])       Active Med List:    Current Outpatient Medications:   •  Acetaminophen 500 MG capsule, Take  by mouth Every 4 (Four) Hours As Needed., Disp: , Rfl:   •  amiodarone (PACERONE) 200 MG tablet, Take 1 tablet by mouth Daily., Disp: , Rfl:   •  brimonidine (ALPHAGAN) 0.2 % ophthalmic solution, Administer  to both eyes 2 (Two) Times a Day., Disp: , Rfl:   •  LUMIGAN 0.01 % ophthalmic drops, PLACE ONE DROP INTO LEFT EYE EVERY NIGHT, Disp: , Rfl:   •  simvastatin (ZOCOR) 20 MG tablet, Take 1 tablet by mouth Every Night., Disp: 90  tablet, Rfl: 3  •  warfarin (COUMADIN) 4 MG tablet, Take 1 tablet by mouth Daily., Disp: 90 tablet, Rfl: 3  •  pantoprazole (PROTONIX) 40 MG EC tablet, Take 1 tablet by mouth 2 (Two) Times a Day Before Meals., Disp: 60 tablet, Rfl: 0     Past Medical History:  Past Medical History:   Diagnosis Date   • Arrhythmia    • CAD (coronary artery disease)    • CHF (congestive heart failure) ([facility])    • Chronic kidney disease     STAGE 3   • Hyperlipidemia    • PAF (paroxysmal atrial fibrillation) ([facility])    • Primary hypertension    • Pulmonary hypertension ([facility])        Past Surgical History:  Past Surgical History:   Procedure Laterality Date   • CARDIAC CATHETERIZATION     • CARDIAC CATHETERIZATION N/A 11/3/2021    Procedure: Right and Left Heart Cath;  Surgeon: Luis Pastor MD;  Location: [facility] CATH INVASIVE LOCATION;  Service: Cardiovascular;  Laterality: N/A;   • CARDIAC CATHETERIZATION N/A 11/3/2021    Procedure: Coronary angiography;  Surgeon: Luis Pastor MD;  Location: [facility] CATH INVASIVE LOCATION;  Service: Cardiovascular;  Laterality: N/A;   • CARDIAC CATHETERIZATION N/A 11/3/2021    Procedure: Left ventriculography;  Surgeon: Luis Pastor MD;  Location: [facility] CATH INVASIVE LOCATION;  Service: Cardiovascular;  Laterality: N/A;   • CARDIAC CATHETERIZATION  11/3/2021    Procedure: Saphenous Vein Graft;  Surgeon: Luis Pastor MD;  Location: [facility] CATH INVASIVE LOCATION;  Service: Cardiovascular;;   • CARDIAC CATHETERIZATION N/A 11/3/2021    Procedure: Native mammary injection;  Surgeon: Luis Pastor MD;  Location: [facility] CATH INVASIVE LOCATION;  Service: Cardiovascular;  Laterality: N/A;   • CARDIAC ELECTROPHYSIOLOGY PROCEDURE N/A 11/3/2021    Procedure: LOOP INSERTION DR ALEXANDRE WILL IMPLANT LOOP FIRST THEN CATH;  Surgeon: Luis Pastor MD;  Location: [facility] CATH INVASIVE LOCATION;  Service: Cardiovascular;  Laterality: N/A;   •  "CARDIAC ELECTROPHYSIOLOGY PROCEDURE N/A 3/11/2022    Procedure: Ablation atrial fibrillation;  Surgeon: Andrew Galvan MD;  Location: [facility] INVASIVE LOCATION;  Service: Cardiovascular;  Laterality: N/A;   • CORONARY ARTERY BYPASS GRAFT  2002   • EYE SURGERY Right    • REPLACEMENT TOTAL KNEE Right        Social History:  Social History     Socioeconomic History   • Marital status:    Tobacco Use   • Smoking status: Never Smoker   • Smokeless tobacco: Current User     Types: Chew   Vaping Use   • Vaping Use: Never used   Substance and Sexual Activity   • Alcohol use: Not Currently   • Drug use: Never   • Sexual activity: Defer       Allergies:  No Known Allergies    Immunizations:  Immunization History   Administered Date(s) Administered   • Flu Vaccine Split Quad 09/12/2019   • Pneumococcal Conjugate 13-Valent (PCV13) 10/18/2018          Objective:         Review of Systems   Constitutional: Negative for fatigue.   Respiratory: Negative for chest tightness and shortness of breath.    Cardiovascular: Negative for chest pain, palpitations and leg swelling.   Neurological: Positive for light-headedness.        Unsteady gait   All other systems reviewed and are negative.       /60   Pulse 76   Ht 170.2 cm (67\")   Wt 89.4 kg (197 lb)   BMI 30.85 kg/m²     Constitutional:       General: Not in acute distress.     Appearance: Well-developed.   Eyes:      General: No scleral icterus.     Conjunctiva/sclera: Conjunctivae normal.      Pupils: Pupils are equal, round, and reactive to light.   HENT:      Head: Normocephalic and atraumatic.   Neck:      Thyroid: No thyromegaly.   Pulmonary:      Effort: Pulmonary effort is normal.      Breath sounds: Normal breath sounds.   Cardiovascular:      Normal rate. Regular rhythm.   Abdominal:      General: Bowel sounds are normal.      Palpations: Abdomen is soft.   Musculoskeletal: Normal range of motion.      Cervical back: Normal range of motion. Skin:    "  General: Skin is warm and dry.   Neurological:      Mental Status: Alert and oriented to person, place, and time.         In-Office Procedure(s):  Procedures  No orders to display      Loop recorder eval interpreted by [facility] 4500  Battery MEREDITH    R wave 0.5mv    Events  No AF    ASCVD RIsk Score::  The ASCVD Risk score (Enochjosh MARTINEZ Jr., et al., 2013) failed to calculate for the following reasons:    The 2013 ASCVD risk score is only valid for ages 40 to 79    Recent Radiology:          Lab Review:       Recent labs reviewed and interpreted for clinical significance and application          Assessment:         No diagnosis found.       Plan:      1. CAD with CABG in 2002 -  cath showed patent grafts, no angina, continue ASA and statin     2. Persistent AF - [id number] of at least 3(age, HTN, vascular disease), on coumadin and amiodarone. Now about 5 weeks post ablation.  No AF documented on loop recorder since last interrogation.  Will need to stop amiodarone due to gait disturbance.     3. HTN - controlled, off lisinopril due to a bump in creatinine post ablation.     4. HLD - statin     RTC 3 months      Level of Care:                 Andrew Galvan MD  05/03/22

## 2022-06-13 ENCOUNTER — ANTICOAGULATION VISIT (OUTPATIENT)
Dept: CARDIOLOGY | Facility: CLINIC | Age: 82
End: 2022-06-13

## 2022-06-13 DIAGNOSIS — Z79.01 LONG TERM (CURRENT) USE OF ANTICOAGULANTS: ICD-10-CM

## 2022-06-13 DIAGNOSIS — I48.0 PAROXYSMAL ATRIAL FIBRILLATION: Primary | ICD-10-CM

## 2022-06-13 LAB — INR PPP: 1.8

## 2022-06-30 ENCOUNTER — ANTICOAGULATION VISIT (OUTPATIENT)
Dept: CARDIOLOGY | Facility: CLINIC | Age: 82
End: 2022-06-30

## 2022-06-30 DIAGNOSIS — Z79.01 LONG TERM (CURRENT) USE OF ANTICOAGULANTS: Primary | ICD-10-CM

## 2022-06-30 LAB — INR PPP: 2.4

## 2022-07-13 ENCOUNTER — TELEPHONE (OUTPATIENT)
Dept: CARDIOLOGY | Facility: CLINIC | Age: 82
End: 2022-07-13

## 2022-07-13 NOTE — TELEPHONE ENCOUNTER
Caller: Roopa Mcleod    Relationship: Emergency Contact    Best call back number: [phone]    What form or medical record are you requesting: COPY OF LOOP RECORDER CARD WITH DETAILS.     Who is requesting this form or medical record from you: [facility]     How would you like to receive the form or medical records (pick-up, mail, fax):   If fax, what is the fax number: [phone]      Timeframe paperwork needed: NEEDED BY TODAY.

## 2022-07-13 NOTE — TELEPHONE ENCOUNTER
Informed the daughter we do not have a card, that would have been sent to the patient. The [facility] place can check with their local rep & have them look him up. She will try to get them to do so.

## 2022-08-17 ENCOUNTER — ANTICOAGULATION VISIT (OUTPATIENT)
Dept: CARDIOLOGY | Facility: CLINIC | Age: 82
End: 2022-08-17

## 2022-08-17 DIAGNOSIS — Z79.01 LONG TERM (CURRENT) USE OF ANTICOAGULANTS: ICD-10-CM

## 2022-08-17 DIAGNOSIS — I48.0 PAROXYSMAL ATRIAL FIBRILLATION: Primary | ICD-10-CM

## 2022-08-17 LAB — INR PPP: 1.8

## 2022-08-22 ENCOUNTER — OFFICE VISIT (OUTPATIENT)
Dept: CARDIOLOGY | Facility: CLINIC | Age: 82
End: 2022-08-22

## 2022-08-22 VITALS
HEIGHT: 67 IN | DIASTOLIC BLOOD PRESSURE: 80 MMHG | WEIGHT: 197 LBS | HEART RATE: 73 BPM | SYSTOLIC BLOOD PRESSURE: 120 MMHG | BODY MASS INDEX: 30.92 KG/M2

## 2022-08-22 DIAGNOSIS — R26.81 UNSTEADY GAIT: Primary | ICD-10-CM

## 2022-08-22 DIAGNOSIS — I25.10 CORONARY ARTERY DISEASE INVOLVING NATIVE CORONARY ARTERY OF NATIVE HEART WITHOUT ANGINA PECTORIS: ICD-10-CM

## 2022-08-22 DIAGNOSIS — Z45.09 ENCOUNTER FOR LOOP RECORDER CHECK: ICD-10-CM

## 2022-08-22 DIAGNOSIS — I48.0 PAROXYSMAL ATRIAL FIBRILLATION: ICD-10-CM

## 2022-08-22 DIAGNOSIS — Z95.1 STATUS POST CORONARY ARTERY BYPASS GRAFT: ICD-10-CM

## 2022-08-22 DIAGNOSIS — G20 PARKINSONISM, UNSPECIFIED PARKINSONISM TYPE: ICD-10-CM

## 2022-08-22 PROCEDURE — 99213 OFFICE O/P EST LOW 20 MIN: CPT | Performed by: INTERNAL MEDICINE

## 2022-08-22 NOTE — PROGRESS NOTES
Subjective:     Encounter Date:2022      Patient ID: Jamin Gardner is a 82 y.o. male.    Chief Complaint:  Chief Complaint   Patient presents with   • Atrial Fibrillation   • Coronary Artery Disease   • Hypertension       HPI:  Mr Gardner is an 81 yo who presents for followup of his AFib.  His past medical history is significant for HTN, HLD, and CAD with a CABG in .  He also has a history of paroxysmal atrial fibrillation and questionable pulmonary HTN.     He has been having symptoms of fatigue, dyspnea and lightheadedness for months.  He has also noted some mild chest discomfort. He denies any palpitations.  His most recent cardiac evaluation has included an echo 10/2021 which showed mild LV enlargement with a normal EF, normal RV size and function, mild MR with severe LAE, AoV sclerosis with mild AR, moderate TR with an estimated RVSP of 30mmHg.  A nuclear stress test 10/2021 showed a fixed inferior defect with no ischemia.     He had a cath  which showed 3 vessel CAD but with patent LIMA-LAD, SVG-ramus, SVG-OM and SVG-RCA.  Pulmonary pressures were normal.  A loop recorder was implanted which showed a high burden of AF, often with RVR.  He was placed on amiodarone.     He was cardioverted in 2022 but could not maintain sinus rhythm.     He was taken to the EP lab 3/2022 where he had the followin. Successful pulmonary vein isolation with demonstration of entrance and exit block  2. Successful ablation of the cavotricuspid isthmus with demonstration of bidirectional conduction block.  3. Normal SA node function  4. Normal AV node conduction  5. Normal His Purkinje conduction     His pre-ablation LISA showed:  · Left ventricular ejection fraction appears to be 56 - 60%. Left ventricular systolic function is normal.  · Left ventricular wall thickness is consistent with mild concentric hypertrophy.  · Left ventricular diastolic function is consistent with (grade I) impaired  relaxation.  · The right ventricular cavity is mildly dilated. Normal right ventricular systolic function noted.  · The left atrial cavity is severely dilated.  · No evidence of a left atrial appendage thrombus was present.  · Mild aortic valve regurgitation is present  · Mild mitral valve regurgitation is present.  · Mild to moderate tricuspid valve regurgitation is present.  · There are moderate to severe plaques in the descending thoracic aorta.  · There is no evidence of pericardial effusion     Following his ablation, he had a bump in his creatinine and his lisinopril and HCTZ were discontinued. He was discharged on amiodarone and coumadin.      Since he was last seen, he has been doing well from a cardiac standpoint.  He is not having angina, dyspnea or palpitations.  However, his family is concerned that he may have Parkinson's due to a tremor, multiple falls and a decline in his mental status.         The following portions of the patient's history were reviewed and updated as appropriate: current medications, past family history, past medical history, past social history, past surgical history and problem list.    Problem List:  Patient Active Problem List   Diagnosis   • Atrial fibrillation (CMS/[facility]) [I48.91]   • Long term (current) use of anticoagulants [Z79.01]   • CAD (coronary artery disease)   • Status post coronary artery bypass graft   • Tobacco use   • PAF (paroxysmal atrial fibrillation) ([facility])   • Primary hypertension   • Pulmonary hypertension ([facility])   • Hyperlipidemia   • Encounter for loop recorder check   • Persistent atrial fibrillation ([facility])       Active Med List:    Current Outpatient Medications:   •  Acetaminophen 500 MG capsule, Take  by mouth Every 4 (Four) Hours As Needed., Disp: , Rfl:   •  brimonidine (ALPHAGAN) 0.2 % ophthalmic solution, Administer  to both eyes 2 (Two) Times a Day., Disp: , Rfl:   •  LUMIGAN 0.01 % ophthalmic drops, PLACE ONE DROP INTO LEFT EYE EVERY NIGHT, Disp: ,  Rfl:   •  simvastatin (ZOCOR) 20 MG tablet, Take 1 tablet by mouth Every Night., Disp: 90 tablet, Rfl: 3  •  warfarin (COUMADIN) 4 MG tablet, Take 1 tablet by mouth Daily., Disp: 90 tablet, Rfl: 3     Past Medical History:  Past Medical History:   Diagnosis Date   • Arrhythmia    • CAD (coronary artery disease)    • CHF (congestive heart failure) ([facility])    • Chronic kidney disease     STAGE 3   • Hyperlipidemia    • PAF (paroxysmal atrial fibrillation) ([facility])    • Primary hypertension    • Pulmonary hypertension ([facility])        Past Surgical History:  Past Surgical History:   Procedure Laterality Date   • CARDIAC CATHETERIZATION     • CARDIAC CATHETERIZATION N/A 11/3/2021    Procedure: Right and Left Heart Cath;  Surgeon: Luis Pastor MD;  Location: [facility] CATH INVASIVE LOCATION;  Service: Cardiovascular;  Laterality: N/A;   • CARDIAC CATHETERIZATION N/A 11/3/2021    Procedure: Coronary angiography;  Surgeon: Luis Pastor MD;  Location: [facility] CATH INVASIVE LOCATION;  Service: Cardiovascular;  Laterality: N/A;   • CARDIAC CATHETERIZATION N/A 11/3/2021    Procedure: Left ventriculography;  Surgeon: Luis Pastor MD;  Location: [facility] CATH INVASIVE LOCATION;  Service: Cardiovascular;  Laterality: N/A;   • CARDIAC CATHETERIZATION  11/3/2021    Procedure: Saphenous Vein Graft;  Surgeon: Luis Pastor MD;  Location: [facility] CATH INVASIVE LOCATION;  Service: Cardiovascular;;   • CARDIAC CATHETERIZATION N/A 11/3/2021    Procedure: Native mammary injection;  Surgeon: Luis Pastor MD;  Location: [facility] CATH INVASIVE LOCATION;  Service: Cardiovascular;  Laterality: N/A;   • CARDIAC ELECTROPHYSIOLOGY PROCEDURE N/A 11/3/2021    Procedure: LOOP INSERTION DR ALEXANDRE WILL IMPLANT LOOP FIRST THEN CATH;  Surgeon: Luis Pastor MD;  Location: [facility] CATH INVASIVE LOCATION;  Service: Cardiovascular;  Laterality: N/A;   • CARDIAC ELECTROPHYSIOLOGY PROCEDURE N/A  "3/11/2022    Procedure: Ablation atrial fibrillation;  Surgeon: Andrew Galvan MD;  Location: [facility] INVASIVE LOCATION;  Service: Cardiovascular;  Laterality: N/A;   • CORONARY ARTERY BYPASS GRAFT  2002   • EYE SURGERY Right    • REPLACEMENT TOTAL KNEE Right        Social History:  Social History     Socioeconomic History   • Marital status:    Tobacco Use   • Smoking status: Never Smoker   • Smokeless tobacco: Current User     Types: Chew   Vaping Use   • Vaping Use: Never used   Substance and Sexual Activity   • Alcohol use: Not Currently   • Drug use: Never   • Sexual activity: Defer       Allergies:  No Known Allergies    Immunizations:  Immunization History   Administered Date(s) Administered   • Flu Vaccine Split Quad 09/12/2019   • Pneumococcal Conjugate 13-Valent (PCV13) 10/18/2018          Objective:         Review of Systems   Constitutional: Negative for fatigue.   Respiratory: Negative for shortness of breath.    Cardiovascular: Negative for chest pain, palpitations and leg swelling.   Neurological: Positive for tremors and light-headedness.   All other systems reviewed and are negative.       /80   Pulse 73   Ht 170.2 cm (67\")   Wt 89.4 kg (197 lb)   BMI 30.85 kg/m²     Constitutional:       General: Not in acute distress.     Appearance: Well-developed.   Eyes:      General: No scleral icterus.     Conjunctiva/sclera: Conjunctivae normal.      Pupils: Pupils are equal, round, and reactive to light.   HENT:      Head: Normocephalic and atraumatic.   Neck:      Thyroid: No thyromegaly.   Pulmonary:      Effort: Pulmonary effort is normal.      Breath sounds: Normal breath sounds.   Cardiovascular:      Normal rate. Regular rhythm.   Abdominal:      General: Bowel sounds are normal.      Palpations: Abdomen is soft.   Musculoskeletal: Normal range of motion.      Cervical back: Normal range of motion. Skin:     General: Skin is warm and dry.   Neurological:      Mental Status: " Alert and oriented to person, place, and time.         In-Office Procedure(s):  Procedures  No orders to display        Loop recorder eval interpreted by me  SJ 4500  Battery OK  R wave 0.5mv    Events - 3 short episodes of AF    ASCVD RIsk Score::  The ASCVD Risk score ([place] MICHELLE Ha., et al., 2013) failed to calculate for the following reasons:    The 2013 ASCVD risk score is only valid for ages 40 to 79    Recent Radiology:          Lab Review:       Recent labs reviewed and interpreted for clinical significance and application          Assessment:          Diagnosis Plan   1. Unsteady gait  Ambulatory Referral to Neurology   2. Parkinsonism, unspecified Parkinsonism type (HCC)  Ambulatory Referral to Neurology   3. Paroxysmal atrial fibrillation (HCC)     4. Coronary artery disease involving native coronary artery of native heart without angina pectoris     5. Encounter for loop recorder check     6. Status post coronary artery bypass graft            Plan:      1. CAD with CABG in 2002 -  cath showed patent grafts, no angina, continue ASA and statin     2. Persistent AF - [id number] of at least 3(age, HTN, vascular disease), on coumadin and amiodarone. Now about 5 months post ablation.  Had 3 brief episodes of AF on loop recorder but asymptomatic, continue coumadin.     3. HTN - controlled, off lisinopril due to a bump in creatinine post ablation.     4. HLD - statin     5.  ? Parkinson's or other neurologic condition - will refer to Neurology    6 Loop recorder eval - normal device function, 3 short episodes of AF      RTC 3 months         Level of Care:                 Andrew Galvan MD  08/22/22

## 2022-10-04 ENCOUNTER — ANTICOAGULATION VISIT (OUTPATIENT)
Dept: CARDIOLOGY | Facility: CLINIC | Age: 82
End: 2022-10-04

## 2022-10-04 DIAGNOSIS — Z79.01 LONG TERM (CURRENT) USE OF ANTICOAGULANTS: ICD-10-CM

## 2022-10-04 DIAGNOSIS — I48.0 PAROXYSMAL ATRIAL FIBRILLATION: Primary | ICD-10-CM

## 2022-10-04 LAB — INR PPP: 1.9

## 2022-10-18 ENCOUNTER — ANTICOAGULATION VISIT (OUTPATIENT)
Dept: CARDIOLOGY | Facility: CLINIC | Age: 82
End: 2022-10-18

## 2022-10-18 DIAGNOSIS — Z79.01 LONG TERM (CURRENT) USE OF ANTICOAGULANTS: ICD-10-CM

## 2022-10-18 DIAGNOSIS — I48.0 PAROXYSMAL ATRIAL FIBRILLATION: Primary | ICD-10-CM

## 2022-10-18 LAB — INR PPP: 2.1

## 2022-11-07 NOTE — PROGRESS NOTES
Subjective: Unsteady gait and Possible Parkinson's Disease    Patient ID: Jamin Gardner is a 82 y.o. male.    CHIEF COMPLAINT:Unsteady gait and Possible Parkinson's Disease    History of Present Illness Mr. Jj Muhammad, is a very pleasant 82-year-old  male who presented today with his daughter Roopa for a new patient appointment referred by Dr. Mast for unsteady gait and possible parkinsonism.  The patient's daughter states that he has had a intermittent tremor of the hands and has had a lot of imbalance.  She also reports that he is sleeping more and not as active as he was before.  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  The patient states he has a hard time driving because he cannot tell if he is stepping on the gas or the brake.  The patient does not have rigidity of arms, has taken small steps but not every time.  The tremor is mostly an action related tremor.    The patient's daughter was notified that with Parkinson's disease the patient has a resting tremor and is most commonly shown as a pill rolling tremor which was demonstrated to her.  She was also shown the small steps and difficulty turning, and freezing up by my demonstration.  The patient has no hypomimia and can smile and laugh which is atypical for a Parkinson's patient.  And there is usually cogwheel rigidity in the shoulders or wrists which was also described to the patient's daughter.  The patient had no cogwheel rigidity.    She was also notified of essential tremor which is an action related tremor that can occur intermittently in the hands with action and also in the head.  She denied any family history of an action related tremor.      Complaint: tremors  Onset:1 year  Location:bue usually with action  Quality: Action related no resting tremor  Severity: Intermittent  Duration:  Daily   Frequency:daily  Timing: Unrelated  Context: Worse when  stressed   modifying factors: Stop action   associated Signs and symptoms:  Feels off balance at times  Current meds: Nothing        MRI BRAIN W W/O  7-  Impression:  1. No acute intracranial abnormality such as acute ischemia, mass effect, hydrocephalus, or intracranial hemorrhage.  No pathologic enhancement within the brain parenchyma  2. Patchy areas of gliosis and chronic ischemia involving the bilateral basal ganglia and thalami.  Moderate to severe microangiopathic white matter disease consistent with chronic small vessel ischemia most often seen as a manifestation of hypertensive arteriosclerosis and/or metabolic facgors such as hypercholesterolemia/hypertiglyceridemia  3. Diffuse cortical atrophy  Dr. Leser      The following portions of the patient's history were reviewed and updated as appropriate: allergies, current medications, past family history, past medical history, past social history, past surgical history and problem list.      Family History   Problem Relation Age of Onset   • Heart disease Mother    • No Known Problems Father    • No Known Problems Sister    • Heart disease Brother        Past Medical History:   Diagnosis Date   • Arrhythmia    • CAD (coronary artery disease)    • CHF (congestive heart failure) ([facility])    • Chronic kidney disease     STAGE 3   • Hyperlipidemia    • PAF (paroxysmal atrial fibrillation) ([facility])    • Primary hypertension    • Pulmonary hypertension ([facility])        Social History     Socioeconomic History   • Marital status:    Tobacco Use   • Smoking status: Never   • Smokeless tobacco: Current     Types: Chew   Vaping Use   • Vaping Use: Never used   Substance and Sexual Activity   • Alcohol use: Not Currently   • Drug use: Never   • Sexual activity: Defer         Current Outpatient Medications:   •  Acetaminophen 500 MG capsule, Take  by mouth Every 4 (Four) Hours As Needed., Disp: , Rfl:   •  brimonidine (ALPHAGAN) 0.2 % ophthalmic solution, Administer   to both eyes 2 (Two) Times a Day., Disp: , Rfl:   •  LUMIGAN 0.01 % ophthalmic drops, PLACE ONE DROP INTO LEFT EYE EVERY NIGHT, Disp: , Rfl:   •  simvastatin (ZOCOR) 20 MG tablet, Take 1 tablet by mouth Every Night., Disp: 90 tablet, Rfl: 3  •  warfarin (COUMADIN) 4 MG tablet, Take 1 tablet by mouth Daily., Disp: 90 tablet, Rfl: 3  •  gabapentin (NEURONTIN) 100 MG capsule, Take 1 tab at bedtime, Disp: 30 capsule, Rfl: 3    Review of Systems   Constitutional: Positive for fatigue.   HENT: Positive for hearing loss.    Respiratory: Positive for shortness of breath and wheezing.    Cardiovascular: Positive for leg swelling.   Gastrointestinal: Positive for abdominal pain and constipation.   Genitourinary: Negative.    Musculoskeletal: Negative.    Neurological: Positive for dizziness, facial asymmetry, speech difficulty, weakness, light-headedness and headaches.   Psychiatric/Behavioral: Positive for agitation.        I have reviewed ROS completed by medical assistant.     Objective:    Neurologic Exam     Mental Status   Oriented to person, place, and time.   Speech: speech is normal     Cranial Nerves     CN III, IV, VI   Pupils are equal, round, and reactive to light.    Gait, Coordination, and Reflexes     Coordination   Finger to nose coordination: normal  Tandem walking coordination: abnormal (Could walk heels, could not walk on toes or tandem)    Reflexes   Right brachioradialis: 1+  Left brachioradialis: 2+  Right biceps: 1+  Left biceps: 2+  Right triceps: 1+  Left triceps: 1+  Right patellar: 2+  Left patellar: 2+  Right achilles: 2+  Left achilles: 2+      Physical Exam  Vitals reviewed.   Constitutional:       Appearance: Normal appearance. He is normal weight.   HENT:      Head: Normocephalic and atraumatic.      Right Ear: Decreased hearing noted.      Left Ear: Decreased hearing noted.      Nose: Nose normal.      Mouth/Throat:      Lips: Pink.      Mouth: Mucous membranes are moist.   Eyes:       General: Lids are normal. Visual field deficit (Blind in right eye) present.      Pupils: Pupils are equal, round, and reactive to light.     Cardiovascular:      Rate and Rhythm: Normal rate and regular rhythm.      Pulses: Normal pulses.      Heart sounds: Normal heart sounds, S1 normal and S2 normal.   Pulmonary:      Effort: Pulmonary effort is normal.      Breath sounds: Normal breath sounds and air entry.   Musculoskeletal:         General: Normal range of motion.      Cervical back: Full passive range of motion without pain and normal range of motion.        Feet:       Comments: 5/5 in all extremities including bilateral , dorsiflexion and plantar flexion of feet.   Skin:     General: Skin is warm and dry.   Neurological:      General: No focal deficit present.      Mental Status: He is alert and oriented to person, place, and time.      Cranial Nerves: No cranial nerve deficit, dysarthria or facial asymmetry.      Sensory: Sensory deficit (Bilateral feet: Plantar surfaces decreased to touch, sharp and vibration not felt in ankles but was felt in knees) present.      Motor: Tremor (Action related intermittent mild tremor) and abnormal muscle tone (Slight increased tone in shoulders versus inability to relax) present. No weakness, atrophy, seizure activity or pronator drift.      Coordination: Romberg sign negative. Coordination normal. Finger-Nose-Finger Test and Heel to Shin Test normal. Impaired rapid alternating movements (Slightly slow).      Gait: Gait abnormal (Ambulates with a tripod cane good step size, no shuffle, good turning.) and tandem walk abnormal (Could walk heels, could not walk on toes or tandem).      Deep Tendon Reflexes: Babinski sign absent on the right side. Babinski sign absent on the left side.      Reflex Scores:       Tricep reflexes are 1+ on the right side and 1+ on the left side.       Bicep reflexes are 1+ on the right side and 2+ on the left side.       Brachioradialis  reflexes are 1+ on the right side and 2+ on the left side.       Patellar reflexes are 2+ on the right side and 2+ on the left side.       Achilles reflexes are 2+ on the right side and 2+ on the left side.  Psychiatric:         Attention and Perception: Attention and perception normal.         Mood and Affect: Mood and affect normal.         Speech: Speech normal.         Behavior: Behavior normal. Behavior is cooperative.         Assessment/Plan:    Discussion: The patient did have marked decreases in sensation in both feet going up to ankles.  This could attribute to him taking small steps or being imbalanced very easily.  The tremor exhibited during this visit was primarily an action related tremor not a resting tremor which is seen with parkinsonism.  The patient has no rigidity, no hypomomia.  The patient does report burning pain in both feet which is usually moderate to severe at night.  He currently has no medication for this discomfort.  I will give him a prescription for some low-dose gabapentin to try.    Diagnoses and all orders for this visit:    1. Neuropathy of both feet (Primary)  -     EMG & Nerve Conduction Test; Future  -     MEREDITH  -     Calcium  -     CBC & Differential  -     Copper, Serum  -     C-reactive Protein  -     Heavy Metals, Blood  -     Protein Elec + Interp, Serum  -     Rheumatoid Factor  -     RPR  -     Sedimentation Rate  -     Vitamin E  -     Vitamin B6  -     Vitamin B12  -     Vitamin B1, Whole Blood  -     gabapentin (NEURONTIN) 100 MG capsule; Take 1 tab at bedtime  Dispense: 30 capsule; Refill: 3    2. Benign essential tremor    The patient feels this is minimal and does not want any medication at this time.    Return in about 4 months (around 3/8/2023) for Dr Seipel .    I spent 41 minutes caring for Jamin on this date of service. This time includes time spent by me in the following activities: preparing for the visit, reviewing tests, obtaining and/or reviewing a  separately obtained history, performing a medically appropriate examination and/or evaluation, counseling and educating the patient/family/caregiver, ordering medications, tests, or procedures and documenting information in the medical record.      This document has been electronically signed by Caro SALINAS on November 8, 2022 12:37 EST

## 2022-11-08 ENCOUNTER — OFFICE VISIT (OUTPATIENT)
Dept: NEUROLOGY | Facility: CLINIC | Age: 82
End: 2022-11-08

## 2022-11-08 VITALS
HEART RATE: 83 BPM | SYSTOLIC BLOOD PRESSURE: 180 MMHG | DIASTOLIC BLOOD PRESSURE: 75 MMHG | HEIGHT: 67 IN | WEIGHT: 199 LBS | TEMPERATURE: 98.4 F | BODY MASS INDEX: 31.23 KG/M2

## 2022-11-08 DIAGNOSIS — G57.93 NEUROPATHY OF BOTH FEET: Primary | ICD-10-CM

## 2022-11-08 DIAGNOSIS — G25.0 BENIGN ESSENTIAL TREMOR: ICD-10-CM

## 2022-11-08 PROBLEM — H44.529 PHTHISIS BULBI: Status: ACTIVE | Noted: 2019-04-11

## 2022-11-08 PROCEDURE — 99203 OFFICE O/P NEW LOW 30 MIN: CPT | Performed by: NURSE PRACTITIONER

## 2022-11-08 RX ORDER — GABAPENTIN 100 MG/1
CAPSULE ORAL
Qty: 30 CAPSULE | Refills: 3 | Status: SHIPPED | OUTPATIENT
Start: 2022-11-08

## 2022-11-10 ENCOUNTER — PATIENT ROUNDING (BHMG ONLY) (OUTPATIENT)
Dept: NEUROLOGY | Facility: CLINIC | Age: 82
End: 2022-11-10

## 2023-01-01 ENCOUNTER — TELEPHONE (OUTPATIENT)
Dept: NEUROLOGY | Facility: CLINIC | Age: 83
End: 2023-01-01

## 2023-01-06 NOTE — PROGRESS NOTES
EMG and Nerve Conduction Studies    The complete report includes the data sheets.     Date of Study: 1/9/23    Referring Provider:ROSARIO HOLGUIN        History:    BLE-NEUROPATHY    Results:    Prior to starting the procedure, the patient's identity was verified, pertinent available records were reviewed, the nature of the procedure was explained, the appropriate site of the exam were confirmed directly with the patient, and a pre-procedure pause was performed for final verification of all the above.    1.  The right sural sensory study was attempted with no response recorded.    2.  The tibial motor distal latencies were slightly prolonged.  The amplitude of the compound muscle action potential markedly reduced with essentially normal conduction velocity from knee to ankle.  The right peroneal motor conduction study also showed similar changes.    3.  There were chronic and acute neurogenic changes in the tibialis anterior and gastrocnemius muscles bilaterally.  In the muscles of the feet tested there was discrete motor units on the left and no activity recorded from the right extensor digitorum brevis or abductor analysis muscle.  The vastus lateralis muscles bilaterally showed mild neurogenic changes.      Impression:    This is an abnormal study.  The findings are consistent with a generalized, length dependent, severe axonal sensorimotor neuropathy.      Electronically signed by :    Joseph Seipel, M.D.  January 9, 2023

## 2023-01-09 ENCOUNTER — PROCEDURE VISIT (OUTPATIENT)
Dept: NEUROLOGY | Facility: CLINIC | Age: 83
End: 2023-01-09
Payer: MEDICARE

## 2023-01-09 VITALS
WEIGHT: 199 LBS | SYSTOLIC BLOOD PRESSURE: 147 MMHG | HEART RATE: 70 BPM | BODY MASS INDEX: 31.23 KG/M2 | DIASTOLIC BLOOD PRESSURE: 80 MMHG | HEIGHT: 67 IN | TEMPERATURE: 98.4 F

## 2023-01-09 DIAGNOSIS — G62.9 SENSORY MOTOR NEUROPATHY: Primary | ICD-10-CM

## 2023-01-09 PROCEDURE — 95908 NRV CNDJ TST 3-4 STUDIES: CPT | Performed by: PSYCHIATRY & NEUROLOGY

## 2023-01-09 PROCEDURE — 95886 MUSC TEST DONE W/N TEST COMP: CPT | Performed by: PSYCHIATRY & NEUROLOGY

## 2023-02-02 RX ORDER — SIMVASTATIN 20 MG
TABLET ORAL
Qty: 90 TABLET | Refills: 3 | OUTPATIENT
Start: 2023-02-02

## 2023-02-03 DIAGNOSIS — I48.20 CHRONIC ATRIAL FIBRILLATION: ICD-10-CM

## 2023-02-03 DIAGNOSIS — Z79.01 LONG TERM (CURRENT) USE OF ANTICOAGULANTS: ICD-10-CM

## 2023-02-03 RX ORDER — WARFARIN SODIUM 4 MG/1
TABLET ORAL
Qty: 90 TABLET | Refills: 3 | OUTPATIENT
Start: 2023-02-03

## 2023-04-25 ENCOUNTER — TELEPHONE (OUTPATIENT)
Dept: CARDIOLOGY | Facility: CLINIC | Age: 83
End: 2023-04-25
Payer: MEDICARE

## 2023-04-25 DIAGNOSIS — G57.93 NEUROPATHY OF BOTH FEET: ICD-10-CM

## 2023-04-25 RX ORDER — GABAPENTIN 100 MG/1
CAPSULE ORAL
Qty: 90 CAPSULE | Refills: 3 | Status: SHIPPED | OUTPATIENT
Start: 2023-04-25

## 2023-04-25 NOTE — TELEPHONE ENCOUNTER
I called patient to see what Cardiologist he will be following up with.  I left msg @ [phone]

## 2023-04-25 NOTE — TELEPHONE ENCOUNTER
"  URGENT      Medication requested (name and dose):     GABAPENTIN 100 MG  TAKE 1 TABLE AT BEDTIME    Pharmacy where request should be sent:     [id number] - [place], [place] - [address] AT [place] [address] [facility] [phone] [facility] [phone]       Additional details provided by patient:     Best call back number:     Does the patient have less than a 3 day supply:  [x] Yes  [] No    Deana Donahue Rep  04/25/23, 13:47 EDT  [id number]}  "

## 2024-04-04 NOTE — ANESTHESIA POSTPROCEDURE EVALUATION
Patient: Jamin Gardner    Procedure Summary     Date: 03/11/22 Room / Location: [facility]EP [facility]  /  [facility] INVASIVE [facility]    Anesthesia Start: 1151 Anesthesia Stop: 1628    Procedures:       Ablation atrial fibrillation (N/A )      3D MAPPING INSYTE EP (N/A ) Diagnosis: Persistent atrial fibrillation (HCC)    Providers: Andrew Galvan MD Provider: Deanna Hernández MD    Anesthesia Type: general ASA Status: 3          Anesthesia Type: general    Vitals  Vitals Value Taken Time   /73 03/11/22 1831   Temp     Pulse 81 03/11/22 1853   Resp 18 03/11/22 1716   SpO2 97 % 03/11/22 1853   Vitals shown include unvalidated device data.        Post Anesthesia Care and Evaluation    Patient location during evaluation: bedside  Patient participation: complete - patient participated  Level of consciousness: awake and alert  Pain management: adequate  Airway patency: patent  Anesthetic complications: No anesthetic complications  PONV Status: controlled  Cardiovascular status: blood pressure returned to baseline and acceptable  Respiratory status: acceptable  Hydration status: acceptable      
unknown

## (undated) DEVICE — KT MANIFLD CARDIAC

## (undated) DEVICE — INTRO STEER AGILIS NXT MED/CURL 8.5F

## (undated) DEVICE — CATH ADVISOR HD GRID MAP SENSR ENABLED

## (undated) DEVICE — CATH EP IMG/CARD VIEWFLEX EXTRA/ICE VF04 120DEG 9F 90CM

## (undated) DEVICE — Device

## (undated) DEVICE — LOU EP: Brand: MEDLINE INDUSTRIES, INC.

## (undated) DEVICE — PERCLOSE PROGLIDE™ SUTURE-MEDIATED CLOSURE SYSTEM: Brand: PERCLOSE PROGLIDE™

## (undated) DEVICE — PINNACLE INTRODUCER SHEATH: Brand: PINNACLE

## (undated) DEVICE — SYS TRNSEP ACC BRK1 ACROSS A/ 98CM

## (undated) DEVICE — FR5 INFINITI MULTIPAC: Brand: INFINITI

## (undated) DEVICE — PERCLOSE™ PROSTYLE™ SUTURE-MEDIATED CLOSURE AND REPAIR SYSTEM: Brand: PERCLOSE™ PROSTYLE™

## (undated) DEVICE — INTRO HEMO FASTCATH CATH/LOCK 7F 12CM

## (undated) DEVICE — CATH DIAG IMPULSE IMT 5F 100CM

## (undated) DEVICE — PK CATH CARD 40

## (undated) DEVICE — CATH ABL IRR BIDIR TACTICATH/SE DF/CRV 8F 2-2-2MM 3.5MM 115

## (undated) DEVICE — TBG ABL COOL PT 2.6M 100042049

## (undated) DEVICE — GW INQWIRE FC PTFE J/3MM .035 180

## (undated) DEVICE — INTRO SHEATH ART/FEM ENGAGE .035 6F12CM

## (undated) DEVICE — KT ELECTRD ENSITE PRECISION SURF

## (undated) DEVICE — GW EMR FIX EXCHG J STD .035 3MM 260CM

## (undated) DEVICE — CATH EP STEER DUO-DECAPOLAR SUPERLNG 7F 2-5-2MM 95CM

## (undated) DEVICE — INTRO SHEATH ART/FEM ENGAGE .035 4F12CM

## (undated) DEVICE — BALN PRESS WEDGE 6F 110CM

## (undated) DEVICE — PROB S-CATH TEMP ESOPH 10F

## (undated) DEVICE — TBG PENCL TELESCP MEGADYNE SMOKE EVAC 10FT

## (undated) DEVICE — CATH EP INQUIRY DECAPLR CRV 6F 2TO5TO2MM 110CM LG